# Patient Record
Sex: FEMALE | URBAN - METROPOLITAN AREA
[De-identification: names, ages, dates, MRNs, and addresses within clinical notes are randomized per-mention and may not be internally consistent; named-entity substitution may affect disease eponyms.]

---

## 2022-04-14 ENCOUNTER — APPOINTMENT (OUTPATIENT)
Dept: URBAN - METROPOLITAN AREA CLINIC 259 | Age: 67
Setting detail: DERMATOLOGY
End: 2022-04-18

## 2022-04-14 DIAGNOSIS — L30.8 OTHER SPECIFIED DERMATITIS: ICD-10-CM

## 2022-04-14 DIAGNOSIS — L21.8 OTHER SEBORRHEIC DERMATITIS: ICD-10-CM

## 2022-04-14 PROCEDURE — OTHER PRESCRIPTION: OTHER

## 2022-04-14 PROCEDURE — 99203 OFFICE O/P NEW LOW 30 MIN: CPT

## 2022-04-14 PROCEDURE — OTHER COUNSELING: OTHER

## 2022-04-14 PROCEDURE — OTHER PRESCRIPTION MEDICATION MANAGEMENT: OTHER

## 2022-04-14 PROCEDURE — OTHER MIPS QUALITY: OTHER

## 2022-04-14 RX ORDER — KETOCONAZOLE 20 MG/ML
SHAMPOO, SUSPENSION TOPICAL
Qty: 1 | Refills: 3 | Status: ERX | COMMUNITY
Start: 2022-04-14

## 2022-04-14 RX ORDER — CLOBETASOL PROPIONATE 0.5 MG/G
OINTMENT TOPICAL
Qty: 1 | Refills: 3 | Status: ERX | COMMUNITY
Start: 2022-04-14

## 2022-04-14 ASSESSMENT — LOCATION SIMPLE DESCRIPTION DERM
LOCATION SIMPLE: RIGHT HAND
LOCATION SIMPLE: SCALP
LOCATION SIMPLE: LEFT HAND

## 2022-04-14 ASSESSMENT — LOCATION DETAILED DESCRIPTION DERM
LOCATION DETAILED: LEFT ULNAR DORSAL HAND
LOCATION DETAILED: RIGHT RADIAL DORSAL HAND
LOCATION DETAILED: RIGHT SUPERIOR PARIETAL SCALP

## 2022-04-14 ASSESSMENT — LOCATION ZONE DERM
LOCATION ZONE: HAND
LOCATION ZONE: SCALP

## 2022-04-14 NOTE — PROCEDURE: PRESCRIPTION MEDICATION MANAGEMENT
Initiate Treatment: Clobetasol 0.05% topical cream BID PRN
Render In Strict Bullet Format?: No
Detail Level: Zone
Continue Regimen: Ketoconazole 2% shampoo
Continue Regimen: Desoximetasone topical cream 2-3 times a week

## 2022-04-14 NOTE — HPI: SECONDARY COMPLAINT
How Severe Is This Condition?: mild
Additional History: Patient is here following up on her seborrheic dermatitis on her scalp. She has been using Ketoconazole shampoo, which has helped. She is here to determine if she should continue with the medication.

## 2022-04-14 NOTE — HPI: RASH
What Type Of Note Output Would You Prefer (Optional)?: Standard Output
How Severe Is Your Rash?: moderate
Is This A New Presentation, Or A Follow-Up?: Rash
Additional History: Patient has dry, cracked, sore hands/fingertips. She has been dealing with this for years, and still hasn’t resolved. She is here seeking treatment options.

## 2022-04-14 NOTE — PROCEDURE: COUNSELING
Detail Level: Zone
Patient Specific Counseling (Will Not Stick From Patient To Patient): Recommended warm water soaks, then apply medications and lotions after under occlusion.

## 2022-04-14 NOTE — PROCEDURE: MIPS QUALITY
Quality 431: Preventive Care And Screening: Unhealthy Alcohol Use - Screening: Patient screened for unhealthy alcohol use using a single question and scores less than 2 times per year
Quality 111:Pneumonia Vaccination Status For Older Adults: Pneumococcal Vaccination not Administered or Previously Received, Reason not Otherwise Specified
Quality 110: Preventive Care And Screening: Influenza Immunization: Influenza Immunization Ordered or Recommended, but not Administered due to system reason
Detail Level: Detailed
Quality 130: Documentation Of Current Medications In The Medical Record: Current Medications Documented
Quality 226: Preventive Care And Screening: Tobacco Use: Screening And Cessation Intervention: Patient screened for tobacco use and is an ex/non-smoker

## 2022-12-12 ENCOUNTER — TRANSFERRED RECORDS (OUTPATIENT)
Dept: HEALTH INFORMATION MANAGEMENT | Facility: CLINIC | Age: 67
End: 2022-12-12

## 2023-01-17 ENCOUNTER — TRANSFERRED RECORDS (OUTPATIENT)
Dept: HEALTH INFORMATION MANAGEMENT | Facility: CLINIC | Age: 68
End: 2023-01-17

## 2023-01-31 ENCOUNTER — TRANSFERRED RECORDS (OUTPATIENT)
Dept: HEALTH INFORMATION MANAGEMENT | Facility: CLINIC | Age: 68
End: 2023-01-31
Payer: COMMERCIAL

## 2023-02-02 ENCOUNTER — PATIENT OUTREACH (OUTPATIENT)
Dept: ONCOLOGY | Facility: CLINIC | Age: 68
End: 2023-02-02
Payer: COMMERCIAL

## 2023-02-02 ENCOUNTER — TRANSCRIBE ORDERS (OUTPATIENT)
Dept: OTHER | Age: 68
End: 2023-02-02

## 2023-02-02 DIAGNOSIS — C50.211 MALIGNANT NEOPLASM OF UPPER-INNER QUADRANT OF RIGHT FEMALE BREAST (H): Primary | ICD-10-CM

## 2023-02-02 NOTE — PROGRESS NOTES
New Patient Oncology Nurse Navigator Note     Referring provider: Ivania Nascimento     Referring Clinic/Organization: [facility]     Referred to (specialty:) Medical Oncology and Radiation Oncology      Date Referral Received: February 2, 2023     Evaluation for:  Breast cancer     Clinical History (per Nurse review of records provided):      Ginger bilateral screening mammogram on 11/18/22 and aspiculated mass has developed at the far posterior, medial aspect of the right breast at about 3 o'clock. Posterior portions of the mass are not included on either view; as included here, the mass measures at least 2.4 cm in greatest dimension. No evidence elsewhere for mass or architectural distortion. No calcifications of concern. Right breast ultrasound followed on 12/2/22 and a solid 2.3 cm hypoechoic irregularly marginated mass at the 3:00 position of the right breast, 9 cm from the nipple, corresponding to the mammographic findings. The findings are highly suspicious for malignancy. A few upper limits of normal-sized right axillary lymph nodes without definitive normal enlargement or abnormal cortical thickening.    12/12/22 - [facility] US-guided biopsy.  [facility] biopsy ([id number])  IDC, Grade 3, ER/SC+, HER2 negative by FISH    1/9/23 - Genetic counseling with Marcelina Persaud of [facility]. It appeared from that note patient elected to pursue genetic testing for the custom cancer gene panel evaluating the breast, gyn and common cancer genes HOWEVER, patient did not move forward with that testing according to our conversation on 2/6/23. She did not mesh with provider and does not want to meet with her for follow up. Instead patient is interested in testing through [employer] and asks if our providers would consider those results appropriate for decision making. She has not started testing with any product yet.    1/17/23 -   A.  Breast, right,lumpectomy: Invasive grade  3 ductal carcinoma  B.  Lymph node, sentinel, biopsy: 1 lymph node negative for malignancy.  C.  Breast, right, superior margin re-excision: Benign breast parenchyma, negative for malignancy.  D.  Breast, right, medial margin re-excision: Benign breast parenchyma, negative for malignancy.    Records Location: Care Everywhere, Media and See Bookmarked material     Records Needed:   Breast imaging for past 5 years (No imaging as of 2/2/23)  Genetic testing results (saw counseling on 1/9/23)  Pathology report from 12/12/22 right breast core biopsy  [facility] consult and progress notes    Patient resides in [place], [place] and referral specifically states [place].

## 2023-02-03 NOTE — PROGRESS NOTES
Telephoned and left voice mail for patient requesting call back to assist in scheduling medical oncology consult.

## 2023-02-06 NOTE — PROGRESS NOTES
Ginger returned my call and call transferred to New Patient Scheduling to schedule with medical oncology and radiation oncology at .

## 2023-02-06 NOTE — TELEPHONE ENCOUNTER
RECORDS STATUS - BREAST    Action    Action Taken 23  Spoke w/ [facility] - they will fax [facility] Bx report ([id number] - [id number]) shortly.     Report received, sent to HIM for STAT upload, emailed to Jammie MOROCHO   11:33 AM    Imaging from [facility], [facility] pending resolution/email to  PACS to resolve.  3:42 PM       RECORDS REQUESTED FROM: [facility]   DATE REQUESTED:    NOTES DETAILS STATUS   OFFICE NOTE from referring provider CE - [facility]    OFFICE NOTE from medical oncologist     OFFICE NOTE from surgeon     OFFICE NOTE from radiation oncologist     DISCHARGE SUMMARY from hospital     DISCHARGE REPORT from the ER     OPERATIVE REPORT CE - [facility] 23: Lumpectomy   MEDICATION LIST     LABS     PATHOLOGY REPORTS  (Tissue diagnosis, Stage, ER/IA percentage positive and intensity of staining, HER2 IHC, FISH, and all biopsies from breast and any distant metastasis)                 [facility], ([id number] is [facility], report requested )  [employer] 23: [id number]  22: [id number]   GENONOMIC TESTING     TYPE:   (Next Generation Sequencing, including Foundation One testing, and Oncotype score)     IMAGING (NEED IMAGES & REPORT)     MAMMO Requested  [facility]  23    [facility]  22    [facility]  18    [facility]  16   ULTRASOUND Requested  [facility]  23    [facility]  22, 22   BONE SCAN Requested /6 [facility]  22, 13   BRAIN MRI Requested  [facility]  3/4/15

## 2023-02-07 ENCOUNTER — PRE VISIT (OUTPATIENT)
Dept: ONCOLOGY | Facility: CLINIC | Age: 68
End: 2023-02-07

## 2023-02-07 ENCOUNTER — ONCOLOGY VISIT (OUTPATIENT)
Dept: ONCOLOGY | Facility: CLINIC | Age: 68
End: 2023-02-07
Payer: COMMERCIAL

## 2023-02-07 VITALS
WEIGHT: 217 LBS | SYSTOLIC BLOOD PRESSURE: 180 MMHG | OXYGEN SATURATION: 99 % | HEART RATE: 91 BPM | DIASTOLIC BLOOD PRESSURE: 86 MMHG

## 2023-02-07 DIAGNOSIS — C50.011 MALIGNANT NEOPLASM OF AREOLA OF RIGHT BREAST IN FEMALE, ESTROGEN RECEPTOR POSITIVE (H): Primary | ICD-10-CM

## 2023-02-07 DIAGNOSIS — Z17.0 MALIGNANT NEOPLASM OF AREOLA OF RIGHT BREAST IN FEMALE, ESTROGEN RECEPTOR POSITIVE (H): Primary | ICD-10-CM

## 2023-02-07 PROBLEM — S32.009K LUMBAR PSEUDOARTHROSIS: Status: ACTIVE | Noted: 2023-02-07

## 2023-02-07 PROBLEM — H18.609 KERATOCONUS: Status: ACTIVE | Noted: 2023-02-07

## 2023-02-07 PROBLEM — M54.41 LUMBAGO WITH SCIATICA, RIGHT SIDE: Status: ACTIVE | Noted: 2017-09-20

## 2023-02-07 PROBLEM — B00.1 HERPES LABIALIS: Status: ACTIVE | Noted: 2023-02-07

## 2023-02-07 PROBLEM — H35.30 MACULAR DEGENERATION (SENILE) OF RETINA: Status: ACTIVE | Noted: 2018-04-11

## 2023-02-07 PROBLEM — Z98.1 S/P LUMBAR FUSION: Status: ACTIVE | Noted: 2018-02-01

## 2023-02-07 PROBLEM — G62.9 NEUROPATHY: Status: ACTIVE | Noted: 2023-02-07

## 2023-02-07 PROBLEM — F32.A DEPRESSION: Status: ACTIVE | Noted: 2023-02-07

## 2023-02-07 PROBLEM — L71.9 ACNE ROSACEA: Status: ACTIVE | Noted: 2023-02-07

## 2023-02-07 PROBLEM — C50.219 MALIGNANT NEOPLASM OF UPPER-INNER QUADRANT OF FEMALE BREAST (H): Status: ACTIVE | Noted: 2023-02-07

## 2023-02-07 PROBLEM — H25.10 NUCLEAR SCLEROTIC CATARACT: Status: ACTIVE | Noted: 2023-02-07

## 2023-02-07 PROBLEM — G25.81 RLS (RESTLESS LEGS SYNDROME): Status: ACTIVE | Noted: 2023-02-07

## 2023-02-07 PROBLEM — D49.9 ESTROGEN RECEPTOR POSITIVE NEOPLASM: Status: ACTIVE | Noted: 2023-02-07

## 2023-02-07 PROCEDURE — 99204 OFFICE O/P NEW MOD 45 MIN: CPT | Performed by: INTERNAL MEDICINE

## 2023-02-07 RX ORDER — DESOXIMETASONE 2.5 MG/G
CREAM TOPICAL
COMMUNITY
Start: 2021-09-27

## 2023-02-07 RX ORDER — LISINOPRIL/HYDROCHLOROTHIAZIDE 10-12.5 MG
1 TABLET ORAL
COMMUNITY
Start: 2022-10-13

## 2023-02-07 RX ORDER — ELETRIPTAN HYDROBROMIDE 40 MG/1
40 TABLET, FILM COATED ORAL
COMMUNITY
End: 2023-07-18

## 2023-02-07 RX ORDER — CYCLOSPORINE 0.5 MG/ML
1 EMULSION OPHTHALMIC 2 TIMES DAILY
COMMUNITY
Start: 2021-07-15

## 2023-02-07 RX ORDER — IBUPROFEN 200 MG
TABLET ORAL
COMMUNITY

## 2023-02-07 RX ORDER — FEXOFENADINE HCL 180 MG/1
180 TABLET ORAL
COMMUNITY

## 2023-02-07 RX ORDER — CELECOXIB 200 MG/1
1 CAPSULE ORAL 2 TIMES DAILY
COMMUNITY
Start: 2021-09-08 | End: 2023-07-18

## 2023-02-07 RX ORDER — FLUTICASONE PROPIONATE 50 MCG
2 SPRAY, SUSPENSION (ML) NASAL
COMMUNITY

## 2023-02-07 RX ORDER — KETOCONAZOLE 20 MG/ML
SHAMPOO TOPICAL
COMMUNITY
Start: 2022-04-14

## 2023-02-07 ASSESSMENT — PAIN SCALES - GENERAL: PAINLEVEL: MILD PAIN (3)

## 2023-02-07 NOTE — LETTER
2/7/2023         RE: Ginger Hernandez  [address] [facility] [address]        Dear Colleague,    Thank you for referring your patient, Ginger Hernandez, to the [facility]. Please see a copy of my visit note below.    [facility]  MEDICAL ONCOLOGY    NEW PATIENT VISIT NOTE    Reason for consultation: Breast cancer    Referring Provider: Ivania Martins MD    Oncology Treatment Summary  1. 11/18/22 DEXA normal  2. 11/18/22 screening mammogram with Right sided mass 2.4 cm  3. 12/2/22 US with 2.3 cm mass and upper limit of normal LN  4. 12/12/22 bx with IDC, ER+DC+ Her2 2+ FISH negative  5. 1/17/23 RIGHT lumpectomy G3 IDC, 3.3 cm 0/1 SLN, ER90%+ DC 20%+ Spi9vyg negative via FISH       HISTORY OF PRESENTING ILLNESS  Pleasant 67 year old seen for a new dx of breast cancer. Her hx is as above. She is doing well post operatively and has no systemic complaints.     PAST MEDICAL HISTORY  Hepatitis C, migraines, HTN, GERD      CURRENT OUTPATIENT MEDICATIONS  No current outpatient medications on file.     No current facility-administered medications for this visit.        ALLERGIES   Not on File     SOCIAL HISTORY  Lives in [place], single, retired, no tobacco, rare etoh, no children.      FAMILY HISTORY  Mother with breast cancer in late 60s, MGM breast cancer in 60s, she was told by a 2nd cousin that they have a brca mutation and was given a piece of paper about it some years ago but has not followed up on it.     REVIEW OF SYSTEMS  Review Of Systems  Skin: negative  Eyes: negative  Ears/Nose/Throat: negative  Respiratory: No shortness of breath, dyspnea on exertion, cough, or hemoptysis  Cardiovascular: negative  Gastrointestinal: negative  Genitourinary: negative  Musculoskeletal: negative  Neurologic: negative  Psychiatric: negative  Hematologic/Lymphatic/Immunologic: negative  Endocrine: negative      PHYSICAL EXAM  B/P: Data Unavailable, T: Data  Unavailable, P: Data Unavailable, R: Data Unavailable  Wt Readings from Last 3 Encounters:   No data found for Wt       ECOG PPS0    Physical Exam  Vitals reviewed.   Constitutional:       Appearance: Normal appearance.   HENT:      Head: Normocephalic and atraumatic.   Eyes:      Extraocular Movements: Extraocular movements intact.      Conjunctiva/sclera: Conjunctivae normal.      Pupils: Pupils are equal, round, and reactive to light.   Cardiovascular:      Rate and Rhythm: Normal rate and regular rhythm.   Pulmonary:      Effort: Pulmonary effort is normal.      Breath sounds: Normal breath sounds.   Abdominal:      General: Abdomen is flat.      Palpations: Abdomen is soft.   Skin:     General: Skin is warm.   Neurological:      General: No focal deficit present.      Mental Status: She is alert and oriented to person, place, and time. Mental status is at baseline.   Psychiatric:         Behavior: Behavior normal.              LABORATORY AND IMAGING STUDIES  Final Diagnosis     A.  Breast, right,lumpectomy: Invasive grade 3 ductal carcinoma  B.  Lymph node, sentinel, biopsy: 1 lymph node negative for malignancy.  C.  Breast, right, superior margin re-excision: Benign breast parenchyma, negative for malignancy.  D.  Breast, right, medial margin re-excision: Benign breast parenchyma, negative for malignancy.     CAP Synoptic Report  Procedure:  excision (less than total mastectomy)  Specimen Laterality:  right  Histologic Type:  Invasive ductal carcinoma (invasive breast carcinoma of no special type)   Histologic Grade ([place] Histologic Score)         Glandular (acinar)/tubular differentiation:  score 3          Nuclear pleomorphism:  score 3          Mitotic rate:  score 3          Overall thgthrthathdtheth:th th4th Tumor Size:   (greatest dimension of largest invasive focus >0.1 cm):   3.3 cm  Tumor Focality:  single focus of invasive carcinoma  Ductal Carcinoma in Situ (DCIS):  present          Architectural pattern(s):   solid         Nuclear grade:  III (high)         Necrosis:  Present, focal (small foci or single cell)         Extensive intraductal component (EIC): negative  Tumor Extent:           Skin:  not present             Nipple:  nipple not present           Skeletal muscle:  not present  Lymphatic and/or Vascular Invasion:  present  Dermal Lymphovascular Invasion:  cannot be determined  Treatment Effect :  No known presurgical therapy  Margins         Invasive carcinoma:  all margins negative for invasive carcinoma               Distance to closest margin(s), specify: 0.1 cm to lateral and anterior margins         DCIS:  all margins negative for DCIS                Distance from DCIS to closest margin (specify): 0.1 cm to lateral margin.               Closest margin(s) to DCIS (required if less than 0.2 cm):  lateral  Regional Lymph Node Status:  all regional lymph nodes negative for tumor 0/1          Total number of lymph nodes examined:  1         Number of sentinel lymph nodes examined:  1  Distant Metastasis:  not applicable  Biomarkers:  performed on previous [facility] biopsy ([id number]); not repeated on current specimen         Estrogen Receptor: Positive (%, strong)         Progesterone Receptor: Positive (20-30%, strong)         HER2: Negative by FISH         Cold Ischemia and Fixation Times:              Meet requirements specified in latest version of the ASCO/CAP guidelines  Tumor block(s) for molecular/send out tests:  A2           ASSESSMENT  AND RECOMMENDATIONS:    1. T2N0M0 ER+IA+Her2 negative IDC s/p RIGHT lumpectomy.   2. Family hx of breast cancer with vague question of a brca mutation in family.      Plan    We discussed her newly diagnosed breast cancer and reviewed the diagnosis, prognosis, stage and options for therapy.      We reviewed both local and systemic therapy for breast cancer. Local therapy is a mastectomy or lumpectomy followed by radiation. These are equivalent in overall survival  however post lumpectomy and radiation there is a small risk of recurrence in the ipsilateral breast cancer if this were to occur one would then have to have a mastectomy.  A sentinel lymph node bx is done with both procedures. She has caro a lumpectomy and is pleased with the result. She is aware she will be needing eventual radiation.    We then discussed risk of systemic recurrence and evaluating this in context of stage, ER/NH and Her2 status. Given that she is ER+NH+ and Her2 negative she would likely be a candidate for oncotype testing. This is predictive for benefit to adjuvant chemotherapy and prognostic to risk of recurrence. We will await her surgery and final pathology and if she meets criteria (< 3 lymph nodes involved) we will plan to send it off. She will then see me when the results are available to discuss next steps.    We also reviewed endocrine therapy and the options. Given her normal bone density we will likely plan for arimidex. She will see me when the oncotype is complete.    Fartun Jackson MD on 2/8/2023 at 8:48 AM             Again, thank you for allowing me to participate in the care of your patient.        Sincerely,        Fartun Jackson MD

## 2023-02-07 NOTE — NURSING NOTE
Oncology Rooming Note    February 7, 2023 1:58 PM   Ginger Hernandez is a 67 year old female who presents for:    Chief Complaint   Patient presents with     Oncology Clinic Visit     New Patient     Initial Vitals: BP (!) 180/86 (BP Location: Left arm, Patient Position: Chair, Cuff Size: Adult Large)   Pulse 91   Wt 98.4 kg (217 lb)   SpO2 99%  There is no height or weight on file to calculate BMI. There is no height or weight on file to calculate BSA.  Mild Pain (3) Comment: Data Unavailable   No LMP recorded.  Allergies reviewed: Yes  Medications reviewed: Yes    Medications: Medication refills not needed today.  Pharmacy name entered into EPIC: Data Unavailable    Clinical concerns: New Patient     Dr. Jackson was notified.      Krysta Estes CMA

## 2023-02-07 NOTE — PROGRESS NOTES
[facility]  MEDICAL ONCOLOGY    NEW PATIENT VISIT NOTE    Reason for consultation: Breast cancer    Referring Provider: Ivania Martins MD    Oncology Treatment Summary  1. 11/18/22 DEXA normal  2. 11/18/22 screening mammogram with Right sided mass 2.4 cm  3. 12/2/22 US with 2.3 cm mass and upper limit of normal LN  4. 12/12/22 bx with IDC, ER+VT+ Her2 2+ FISH negative  5. 1/17/23 RIGHT lumpectomy G3 IDC, 3.3 cm 0/1 SLN, ER90%+ VT 20%+ Gkz8vup negative via FISH       HISTORY OF PRESENTING ILLNESS  Pleasant 67 year old seen for a new dx of breast cancer. Her hx is as above. She is doing well post operatively and has no systemic complaints.     PAST MEDICAL HISTORY  Hepatitis C, migraines, HTN, GERD      CURRENT OUTPATIENT MEDICATIONS  No current outpatient medications on file.     No current facility-administered medications for this visit.        ALLERGIES   Not on File     SOCIAL HISTORY  Lives in [place], single, retired, no tobacco, rare etoh, no children.      FAMILY HISTORY  Mother with breast cancer in late 60s, MGM breast cancer in 60s, she was told by a 2nd cousin that they have a brca mutation and was given a piece of paper about it some years ago but has not followed up on it.     REVIEW OF SYSTEMS  Review Of Systems  Skin: negative  Eyes: negative  Ears/Nose/Throat: negative  Respiratory: No shortness of breath, dyspnea on exertion, cough, or hemoptysis  Cardiovascular: negative  Gastrointestinal: negative  Genitourinary: negative  Musculoskeletal: negative  Neurologic: negative  Psychiatric: negative  Hematologic/Lymphatic/Immunologic: negative  Endocrine: negative      PHYSICAL EXAM  B/P: Data Unavailable, T: Data Unavailable, P: Data Unavailable, R: Data Unavailable  Wt Readings from Last 3 Encounters:   No data found for Wt       ECOG PPS0    Physical Exam  Vitals reviewed.   Constitutional:       Appearance: Normal appearance.   HENT:      Head: Normocephalic and atraumatic.    Eyes:      Extraocular Movements: Extraocular movements intact.      Conjunctiva/sclera: Conjunctivae normal.      Pupils: Pupils are equal, round, and reactive to light.   Cardiovascular:      Rate and Rhythm: Normal rate and regular rhythm.   Pulmonary:      Effort: Pulmonary effort is normal.      Breath sounds: Normal breath sounds.   Abdominal:      General: Abdomen is flat.      Palpations: Abdomen is soft.   Skin:     General: Skin is warm.   Neurological:      General: No focal deficit present.      Mental Status: She is alert and oriented to person, place, and time. Mental status is at baseline.   Psychiatric:         Behavior: Behavior normal.              LABORATORY AND IMAGING STUDIES  Final Diagnosis     A.  Breast, right,lumpectomy: Invasive grade 3 ductal carcinoma  B.  Lymph node, sentinel, biopsy: 1 lymph node negative for malignancy.  C.  Breast, right, superior margin re-excision: Benign breast parenchyma, negative for malignancy.  D.  Breast, right, medial margin re-excision: Benign breast parenchyma, negative for malignancy.     CAP Synoptic Report  Procedure:  excision (less than total mastectomy)  Specimen Laterality:  right  Histologic Type:  Invasive ductal carcinoma (invasive breast carcinoma of no special type)   Histologic Grade ([place] Histologic Score)         Glandular (acinar)/tubular differentiation:  score 3          Nuclear pleomorphism:  score 3          Mitotic rate:  score 3          Overall thgthrthathdtheth:th th4th Tumor Size:   (greatest dimension of largest invasive focus >0.1 cm):   3.3 cm  Tumor Focality:  single focus of invasive carcinoma  Ductal Carcinoma in Situ (DCIS):  present          Architectural pattern(s):  solid         Nuclear grade:  III (high)         Necrosis:  Present, focal (small foci or single cell)         Extensive intraductal component (EIC): negative  Tumor Extent:           Skin:  not present             Nipple:  nipple not present           Skeletal  muscle:  not present  Lymphatic and/or Vascular Invasion:  present  Dermal Lymphovascular Invasion:  cannot be determined  Treatment Effect :  No known presurgical therapy  Margins         Invasive carcinoma:  all margins negative for invasive carcinoma               Distance to closest margin(s), specify: 0.1 cm to lateral and anterior margins         DCIS:  all margins negative for DCIS                Distance from DCIS to closest margin (specify): 0.1 cm to lateral margin.               Closest margin(s) to DCIS (required if less than 0.2 cm):  lateral  Regional Lymph Node Status:  all regional lymph nodes negative for tumor 0/1          Total number of lymph nodes examined:  1         Number of sentinel lymph nodes examined:  1  Distant Metastasis:  not applicable  Biomarkers:  performed on previous [facility] biopsy ([id number]); not repeated on current specimen         Estrogen Receptor: Positive (%, strong)         Progesterone Receptor: Positive (20-30%, strong)         HER2: Negative by FISH         Cold Ischemia and Fixation Times:              Meet requirements specified in latest version of the ASCO/CAP guidelines  Tumor block(s) for molecular/send out tests:  A2           ASSESSMENT  AND RECOMMENDATIONS:    1. T2N0M0 ER+DC+Her2 negative IDC s/p RIGHT lumpectomy.   2. Family hx of breast cancer with vague question of a brca mutation in family.      Plan    We discussed her newly diagnosed breast cancer and reviewed the diagnosis, prognosis, stage and options for therapy.      We reviewed both local and systemic therapy for breast cancer. Local therapy is a mastectomy or lumpectomy followed by radiation. These are equivalent in overall survival however post lumpectomy and radiation there is a small risk of recurrence in the ipsilateral breast cancer if this were to occur one would then have to have a mastectomy.  A sentinel lymph node bx is done with both procedures. She has caro a lumpectomy and is  pleased with the result. She is aware she will be needing eventual radiation.    We then discussed risk of systemic recurrence and evaluating this in context of stage, ER/DC and Her2 status. Given that she is ER+DC+ and Her2 negative she would likely be a candidate for oncotype testing. This is predictive for benefit to adjuvant chemotherapy and prognostic to risk of recurrence. We will await her surgery and final pathology and if she meets criteria (< 3 lymph nodes involved) we will plan to send it off. She will then see me when the results are available to discuss next steps.    We also reviewed endocrine therapy and the options. Given her normal bone density we will likely plan for arimidex. She will see me when the oncotype is complete.    Fartun Jackson MD on 2/8/2023 at 8:48 AM

## 2023-02-08 ENCOUNTER — MEDICAL CORRESPONDENCE (OUTPATIENT)
Dept: HEALTH INFORMATION MANAGEMENT | Facility: CLINIC | Age: 68
End: 2023-02-08
Payer: COMMERCIAL

## 2023-02-09 ENCOUNTER — PATIENT OUTREACH (OUTPATIENT)
Dept: ONCOLOGY | Facility: CLINIC | Age: 68
End: 2023-02-09

## 2023-02-09 NOTE — PROGRESS NOTES
Oncotype testing requested via [employer] online portal on specimen # [id number] collected on 1/17/23 at [facility].   Pathology report, face sheet, copy of insurance card, and oncology visit note faxed to [phone].

## 2023-02-23 ENCOUNTER — PATIENT OUTREACH (OUTPATIENT)
Dept: ONCOLOGY | Facility: CLINIC | Age: 68
End: 2023-02-23
Payer: COMMERCIAL

## 2023-02-23 NOTE — PROGRESS NOTES
[facility]: Cancer Care Note    Received final Oncotype Dx results report via fax from [employer].  Per results report, patient's Recurrence Score (RS) Result is 36.  Report sent to [facility] for scanning, and a copy was saved for Dr. Jackson to review.      Patient has an appointment scheduled with Dr. Jackson tomorrow to review results.                                   Pedrito Kerns, RN, BSN, OCN  RN Care Coordinator - Oncology  [facility]

## 2023-02-24 ENCOUNTER — PATIENT OUTREACH (OUTPATIENT)
Dept: ONCOLOGY | Facility: CLINIC | Age: 68
End: 2023-02-24

## 2023-02-24 ENCOUNTER — ONCOLOGY VISIT (OUTPATIENT)
Dept: ONCOLOGY | Facility: CLINIC | Age: 68
End: 2023-02-24
Payer: COMMERCIAL

## 2023-02-24 VITALS — SYSTOLIC BLOOD PRESSURE: 179 MMHG | WEIGHT: 213 LBS | HEART RATE: 89 BPM | DIASTOLIC BLOOD PRESSURE: 101 MMHG

## 2023-02-24 DIAGNOSIS — C50.011 MALIGNANT NEOPLASM OF AREOLA OF RIGHT BREAST IN FEMALE, ESTROGEN RECEPTOR POSITIVE (H): Primary | ICD-10-CM

## 2023-02-24 DIAGNOSIS — Z17.0 MALIGNANT NEOPLASM OF AREOLA OF RIGHT BREAST IN FEMALE, ESTROGEN RECEPTOR POSITIVE (H): Primary | ICD-10-CM

## 2023-02-24 LAB
ALBUMIN SERPL-MCNC: 4 G/DL (ref 3.4–5)
ALP SERPL-CCNC: 54 U/L (ref 40–150)
ALT SERPL W P-5'-P-CCNC: 54 U/L (ref 0–50)
ANION GAP SERPL CALCULATED.3IONS-SCNC: 3 MMOL/L (ref 3–14)
AST SERPL W P-5'-P-CCNC: 21 U/L (ref 0–45)
BASOPHILS # BLD AUTO: 0.1 10E3/UL (ref 0–0.2)
BASOPHILS NFR BLD AUTO: 1 %
BILIRUB SERPL-MCNC: 0.3 MG/DL (ref 0.2–1.3)
BUN SERPL-MCNC: 15 MG/DL (ref 7–30)
CALCIUM SERPL-MCNC: 9.3 MG/DL (ref 8.5–10.1)
CHLORIDE BLD-SCNC: 110 MMOL/L (ref 94–109)
CO2 SERPL-SCNC: 26 MMOL/L (ref 20–32)
CREAT SERPL-MCNC: 0.76 MG/DL (ref 0.52–1.04)
EOSINOPHIL # BLD AUTO: 0.2 10E3/UL (ref 0–0.7)
EOSINOPHIL NFR BLD AUTO: 2 %
ERYTHROCYTE [DISTWIDTH] IN BLOOD BY AUTOMATED COUNT: 12.8 % (ref 10–15)
GFR SERPL CREATININE-BSD FRML MDRD: 85 ML/MIN/1.73M2
GLUCOSE BLD-MCNC: 148 MG/DL (ref 70–99)
HCT VFR BLD AUTO: 47.7 % (ref 35–47)
HGB BLD-MCNC: 15.7 G/DL (ref 11.7–15.7)
IMM GRANULOCYTES # BLD: 0 10E3/UL
IMM GRANULOCYTES NFR BLD: 0 %
LYMPHOCYTES # BLD AUTO: 3.4 10E3/UL (ref 0.8–5.3)
LYMPHOCYTES NFR BLD AUTO: 36 %
MCH RBC QN AUTO: 28.7 PG (ref 26.5–33)
MCHC RBC AUTO-ENTMCNC: 32.9 G/DL (ref 31.5–36.5)
MCV RBC AUTO: 87 FL (ref 78–100)
MONOCYTES # BLD AUTO: 0.8 10E3/UL (ref 0–1.3)
MONOCYTES NFR BLD AUTO: 9 %
NEUTROPHILS # BLD AUTO: 5 10E3/UL (ref 1.6–8.3)
NEUTROPHILS NFR BLD AUTO: 52 %
NRBC # BLD AUTO: 0 10E3/UL
NRBC BLD AUTO-RTO: 0 /100
PLATELET # BLD AUTO: 254 10E3/UL (ref 150–450)
POTASSIUM BLD-SCNC: 4.1 MMOL/L (ref 3.4–5.3)
PROT SERPL-MCNC: 7.3 G/DL (ref 6.8–8.8)
RBC # BLD AUTO: 5.47 10E6/UL (ref 3.8–5.2)
SODIUM SERPL-SCNC: 139 MMOL/L (ref 133–144)
WBC # BLD AUTO: 9.4 10E3/UL (ref 4–11)

## 2023-02-24 PROCEDURE — 99214 OFFICE O/P EST MOD 30 MIN: CPT | Performed by: INTERNAL MEDICINE

## 2023-02-24 PROCEDURE — 85025 COMPLETE CBC W/AUTO DIFF WBC: CPT | Performed by: INTERNAL MEDICINE

## 2023-02-24 PROCEDURE — 80053 COMPREHEN METABOLIC PANEL: CPT | Performed by: INTERNAL MEDICINE

## 2023-02-24 PROCEDURE — 36415 COLL VENOUS BLD VENIPUNCTURE: CPT | Performed by: INTERNAL MEDICINE

## 2023-02-24 RX ORDER — HEPARIN SODIUM (PORCINE) LOCK FLUSH IV SOLN 100 UNIT/ML 100 UNIT/ML
5 SOLUTION INTRAVENOUS
Status: CANCELLED | OUTPATIENT
Start: 2023-03-02

## 2023-02-24 RX ORDER — HEPARIN SODIUM,PORCINE 10 UNIT/ML
5 VIAL (ML) INTRAVENOUS
Status: CANCELLED | OUTPATIENT
Start: 2023-03-02

## 2023-02-24 RX ORDER — ALBUTEROL SULFATE 0.83 MG/ML
2.5 SOLUTION RESPIRATORY (INHALATION)
Status: CANCELLED | OUTPATIENT
Start: 2023-03-02

## 2023-02-24 RX ORDER — DIPHENHYDRAMINE HYDROCHLORIDE 50 MG/ML
50 INJECTION INTRAMUSCULAR; INTRAVENOUS
Status: CANCELLED
Start: 2023-03-02

## 2023-02-24 RX ORDER — ALBUTEROL SULFATE 90 UG/1
1-2 AEROSOL, METERED RESPIRATORY (INHALATION)
Status: CANCELLED
Start: 2023-03-02

## 2023-02-24 RX ORDER — MEPERIDINE HYDROCHLORIDE 25 MG/ML
25 INJECTION INTRAMUSCULAR; INTRAVENOUS; SUBCUTANEOUS EVERY 30 MIN PRN
Status: CANCELLED | OUTPATIENT
Start: 2023-03-02

## 2023-02-24 RX ORDER — ONDANSETRON 2 MG/ML
8 INJECTION INTRAMUSCULAR; INTRAVENOUS ONCE
Status: CANCELLED | OUTPATIENT
Start: 2023-03-02

## 2023-02-24 RX ORDER — METHYLPREDNISOLONE SODIUM SUCCINATE 125 MG/2ML
125 INJECTION, POWDER, LYOPHILIZED, FOR SOLUTION INTRAMUSCULAR; INTRAVENOUS
Status: CANCELLED
Start: 2023-03-02

## 2023-02-24 RX ORDER — EPINEPHRINE 1 MG/ML
0.3 INJECTION, SOLUTION INTRAMUSCULAR; SUBCUTANEOUS EVERY 5 MIN PRN
Status: CANCELLED | OUTPATIENT
Start: 2023-03-02

## 2023-02-24 NOTE — PROGRESS NOTES
[facility]: Cancer Care Initial Note                                    Discussion with Patient:                                                      [facility]: Chemotherapy Education Notes    Chemotherapy education was completed with patient today in person. Handouts from [facility] were discussed and provided to the patient to take home.  Reviewed the following information with the patient:     Chemotherapy Regimen and Schedule: Taxotere/Cytoxan, with infusions every 3 weeks.  4 cycles are planned.    Tests and/or procedures required prior to chemotherapy start:   1. Blood work to be done today    General Chemotherapy Information: Specific medication names and medication delivery methods; possible chemotherapy side effects and management of side effects, including but not limited to: skin changes/nail changes, anemia, neutropenia, thrombocytopenia, diarrhea/constipation, nausea/vomiting, hair loss, memory changes, mouth sores, taste changes, appetite changes, peripheral neuropathy, fatigue, infertility, myelosuppression, increased risk for infection, increased risk for bleeding and/or bruising, possible allergic or hypersensitivity reaction.  Reviewed the importance of infection prevention, and ways to stay healthy during chemotherapy including eating a health, well-balanced diet, adequate protein intake, and drinking plenty of fluids.  Reviewed the practice of closely monitoring lab values throughout chemotherapy treatment, what lab tests will be checked (and what changes of these values meant), along with the possibility of IV hydration or blood product transfusion, or the need to defer or hold treatment.  Also reviewed signs/symptoms that should be reported to care team or on-call provider immediately, including: temperature of 100.4 degrees or higher, shortness of breath, chest pain, unusual bruising, bleeding symptoms, extreme fatigue, >4-6 episodes of diarrhea in 24 hours, uncontrolled  "nausea/vomiting, symptoms of dehydration, or signs of an allergic reaction.      MD has discussed potential gonadotoxicity and detrimental effects on fertility related to chemotherapy treatment.  Patient verbalizes understanding, but declines at this time..     Written Information: Patient was provided with the following handouts from [facility]: \"Getting Ready for Chemotherapy: What to Expect, Before, During, and After your Treatment,\" \"Eating Hints: Before, During, and After Cancer Treatment,\" printouts on possible chemotherapy side effects/ways to cope with side effects, \"When to Call the Doctor,\" and \"Self-Care Tips During Cancer Treatment.\"  Patient was also provided with drug-specific handouts from [facility].  Patient was also provided with information regarding various programs offered at [facility], hair loss resources (if applicable), a list of resources for cancer patients, as well as important contact information for our cancer clinic including scheduling team, nurse triage line, direct phone number for RN Care Coordinator, and the after-hours Nurse Advice Line.    No barriers to learning identified. Patient verbalized understanding of all written and verbal information. All questions answered to patient s satisfaction.  Learning barriers and method preference are documented in the patient education flowsheet. Patient states understanding and is in agreement with this plan.  Patient understands that they will be receiving a phone call from a member of our scheduling team to schedule future appointments.  Patient instructed to call with further questions or concerns.    Assessment:                                                      Initial  Current living arrangement:: I live alone  Informal Support system:: Friends  Bed or wheelchair confined:: No  Mobility Status: Independent w/Device  Transportation means:: Regular car  Medication adherence problem (GOAL):: No  Referrals Placed: " Outpatient Infusion  Advanced Care Plan/Directive Status: Considering Options  Patient Reported Pain?: Yes, is currently well-managed  Pain Score: Mild Pain (3)    Plan of Care Education Review:   Assessment completed with:: Patient    Current living arrangement  I live alone    Plan of Care Education   Yearly learning assessment completed?: Yes (see Education tab)  Diagnosis:: Right breast cancer  Does patient understand diagnosis?: Yes  Tx plan/regimen:: TCx4 cycles  Does patient understand treatment plan/regimen?: Yes  Preparing for treatment:: Reviewed treatment preparation information with patient (vascular access, day of chemo, visitor policy, what to bring, etc.)  Vascular access:: Peripheral IV  Side effect education:: Diarrhea/Constipation;Fatigue;Hair loss;Neuropathy;Nausea/Vomiting;Mylosuppression;Lab value monitoring (anemia, neutropenia, thrombocytopenia)  Transportation means:: Regular car  Safety/self care at home reviewed with patient:: Yes  Informal Support system:: Friends  Coping - concerns/fears reviewed with patient:: Yes  Plan of Care:: Lab appointment;Treatment schedule  When to call provider:: Increased shortness of breath;New/worsening pain;Shaking chills;Temperature >100.4F;Uncontrolled diarrhea/constipation;Uncontrolled nausea/vomiting;Bleeding  Reasons for deferring treatment reviewed with patient:: Yes    Evaluation of Learning  Patient Education Provided: Yes  Readiness:: Acceptance  Method:: Booklet/Handout;Explanation  Response:: Verbalizes understanding           Intervention/Education provided during outreach:                                                       Patient to follow up as scheduled at next appt  Confirmed patient has clinic and triage numbers    Signature:  Charissa Pritchett RN-BSN, PHN, OCN  [facility]

## 2023-02-24 NOTE — PROGRESS NOTES
[facility] PHYSICIANS  MEDICAL ONCOLOGY    RETURN PATIENT VISIT NOTE    Reason for visit: Breast cancer    Oncology Treatment Summary  1. 11/18/22 DEXA normal  2. 11/18/22 screening mammogram with Right sided mass 2.4 cm  3. 12/2/22 US with 2.3 cm mass and upper limit of normal LN  4. 12/12/22 bx with IDC, ER+PA+ Her2 2+ FISH negative  5. 1/17/23 RIGHT lumpectomy G3 IDC, 3.3 cm 0/1 SLN, ER90%+ PA 20%+ Gbg1jsy negative via FISH  6. Oncotype recurrence score 36, high risk     HISTORY OF PRESENTING ILLNESS  Pleasant 67 year old seen today for followup of her oncotype recurrence score. She has a lot of arthritis and walks with a cane. 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     PAST MEDICAL HISTORY  Hepatitis C, migraines, HTN, GERD, lumbar fusion, arthritis.      CURRENT OUTPATIENT MEDICATIONS  Current Outpatient Medications   Medication     celecoxib (CELEBREX) 200 MG capsule     Cholecalciferol 250 MCG (24926 UT) CAPS     cycloSPORINE (RESTASIS) 0.05 % ophthalmic emulsion     desoximetasone (TOPICORT) 0.25 % external cream     eletriptan (RELPAX) 40 MG tablet     esomeprazole (NEXIUM) 20 MG DR capsule     fexofenadine (ALLEGRA) 180 MG tablet     fluticasone (FLONASE) 50 MCG/ACT nasal spray     ibuprofen (ADVIL/MOTRIN) 200 MG tablet     ketoconazole (NIZORAL) 2 % external shampoo     lisinopril-hydrochlorothiazide (ZESTORETIC) 10-12.5 MG tablet     omeprazole (PRILOSEC) 20 MG DR capsule     No current facility-administered medications for this visit.        ALLERGIES     Allergies   Allergen Reactions     Diagnostic X-Ray Materials Anaphylaxis and Hives     Other reaction(s): Unknown  HUT Comment: xray dye; HUT Reaction: Anaphylaxis; HUT Reaction: Hives; HUT Severity: High; HUT Noted: 20110301  xray dye  xray dye       Ketamine Other (See Comments)     Other reaction(s): Emotional Disturbance,  "Unknown  \"complete, emotional reaction\"; HUT Comment: \"complete, emotional reaction\"\"complete, emotional reaction\"; HUT Reaction: Emotional Disturbance; HUT Noted: 20180525  \"complete, emotional reaction\"  \"complete, emotional reaction\"  \"complete, emotional reaction\"  Other reaction(s): Emotional Disturbance  \"complete, emotional reaction\"  \"complete, emotional reaction\"       Nabumetone      Other reaction(s): Gastrointestinal, GI Upset, Unknown  gerd; HUT Comment: gerd; HUT Reaction: Gastrointestinal; HUT Noted: 20130123  gerd  Other reaction(s): Gastrointestinal, GI Upset  gerd  gerd       Pregabalin      Other reaction(s): confusion, Confusion, Confusion, Mental Status Change  HUT Reaction: Confusion; HUT Reaction: Confusion; HUT Noted: 20120315  Other reaction(s): Confusion, Mental Status Change          SOCIAL HISTORY  Lives in [place], single, retired, no tobacco, rare etoh, no children.      FAMILY HISTORY  Mother with breast cancer in late 60s, MGM breast cancer in 60s, she was told by a 2nd cousin that they have a brca mutation and was given a piece of paper about it some years ago but has not followed up on it.     REVIEW OF SYSTEMS  Review Of Systems  Skin: negative  Eyes: negative  Ears/Nose/Throat: negative  Respiratory: No shortness of breath, dyspnea on exertion, cough, or hemoptysis  Cardiovascular: negative  Gastrointestinal: negative  Genitourinary: negative  Musculoskeletal: negative  Neurologic: negative  Psychiatric: negative  Hematologic/Lymphatic/Immunologic: negative  Endocrine: negative      PHYSICAL EXAM  B/P: Data Unavailable, T: Data Unavailable, P: Data Unavailable, R: Data Unavailable  Wt Readings from Last 3 Encounters:   02/07/23 98.4 kg (217 lb)       ECOG PPS0    Physical Exam  Vitals reviewed.   Constitutional:       Appearance: Normal appearance.   HENT:      Head: Normocephalic and atraumatic.   Eyes:      Extraocular Movements: Extraocular movements intact.      " Conjunctiva/sclera: Conjunctivae normal.      Pupils: Pupils are equal, round, and reactive to light.   Neurological:      General: No focal deficit present.      Mental Status: She is alert and oriented to person, place, and time. Mental status is at baseline.   Psychiatric:         Behavior: Behavior normal.              LABORATORY AND IMAGING STUDIES  Final Diagnosis     A.  Breast, right,lumpectomy: Invasive grade 3 ductal carcinoma  B.  Lymph node, sentinel, biopsy: 1 lymph node negative for malignancy.  C.  Breast, right, superior margin re-excision: Benign breast parenchyma, negative for malignancy.  D.  Breast, right, medial margin re-excision: Benign breast parenchyma, negative for malignancy.     CAP Synoptic Report  Procedure:  excision (less than total mastectomy)  Specimen Laterality:  right  Histologic Type:  Invasive ductal carcinoma (invasive breast carcinoma of no special type)   Histologic Grade (Gilbert Histologic Score)         Glandular (acinar)/tubular differentiation:  score 3          Nuclear pleomorphism:  score 3          Mitotic rate:  score 3          Overall ndgndrndanddndend:nd nd2nd Tumor Size:   (greatest dimension of largest invasive focus >0.1 cm):   3.3 cm  Tumor Focality:  single focus of invasive carcinoma  Ductal Carcinoma in Situ (DCIS):  present          Architectural pattern(s):  solid         Nuclear grade:  III (high)         Necrosis:  Present, focal (small foci or single cell)         Extensive intraductal component (EIC): negative  Tumor Extent:           Skin:  not present             Nipple:  nipple not present           Skeletal muscle:  not present  Lymphatic and/or Vascular Invasion:  present  Dermal Lymphovascular Invasion:  cannot be determined  Treatment Effect :  No known presurgical therapy  Margins         Invasive carcinoma:  all margins negative for invasive carcinoma               Distance to closest margin(s), specify: 0.1 cm to lateral and anterior margins          DCIS:  all margins negative for DCIS                Distance from DCIS to closest margin (specify): 0.1 cm to lateral margin.               Closest margin(s) to DCIS (required if less than 0.2 cm):  lateral  Regional Lymph Node Status:  all regional lymph nodes negative for tumor 0/1          Total number of lymph nodes examined:  1         Number of sentinel lymph nodes examined:  1  Distant Metastasis:  not applicable  Biomarkers:  performed on previous [facility] biopsy ([id number]); not repeated on current specimen         Estrogen Receptor: Positive (%, strong)         Progesterone Receptor: Positive (20-30%, strong)         HER2: Negative by FISH         Cold Ischemia and Fixation Times:              Meet requirements specified in latest version of the ASCO/CAP guidelines  Tumor block(s) for molecular/send out tests:  A2           ASSESSMENT  AND RECOMMENDATIONS:    1. T2N0M0 ER+WY+Her2 negative IDC s/p RIGHT lumpectomy, high risk oncotype score 36.   2. Family hx of breast cancer with vague question of a brca mutation in family.      Plan    1. She tells me today she isnt planning to go through with genetic testing. I have suggested we just hold on it for now and rediscuss in a few months.  2. we discussed her oncotype score of 36 and risk of recurrence. There is a statistically significant benefit to the use of adjuvant chemotherapy. We reviewed options for therapy being taxotere/cytoxan for 4 cycles vs AC then Taxol weekly. I reviewed the differences and side effects. After discussion she would like to do taxotere cytoxan. We will work on scheduling this  3. Reschedule radiation consult for at the end of chemotherapy.    Fartun Jackson MD on 2/24/2023 at 4:24 PM

## 2023-02-24 NOTE — LETTER
2/24/2023         RE: Ginger Hernandez  [address] [facility] [address]        Dear Colleague,    Thank you for referring your patient, Ginger Hernandez, to the [facility]. Please see a copy of my visit note below.    [facility] PHYSICIANS  MEDICAL ONCOLOGY    RETURN PATIENT VISIT NOTE    Reason for visit: Breast cancer    Oncology Treatment Summary  1. 11/18/22 DEXA normal  2. 11/18/22 screening mammogram with Right sided mass 2.4 cm  3. 12/2/22 US with 2.3 cm mass and upper limit of normal LN  4. 12/12/22 bx with IDC, ER+MA+ Her2 2+ FISH negative  5. 1/17/23 RIGHT lumpectomy G3 IDC, 3.3 cm 0/1 SLN, ER90%+ MA 20%+ Aas8noc negative via FISH  6. Oncotype recurrence score 36, high risk     HISTORY OF PRESENTING ILLNESS  Pleasant 67 year old seen today for followup of her oncotype recurrence score. She has a lot of arthritis and walks with a cane. 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     PAST MEDICAL HISTORY  Hepatitis C, migraines, HTN, GERD, lumbar fusion, arthritis.      CURRENT OUTPATIENT MEDICATIONS  Current Outpatient Medications   Medication     celecoxib (CELEBREX) 200 MG capsule     Cholecalciferol 250 MCG (73926 UT) CAPS     cycloSPORINE (RESTASIS) 0.05 % ophthalmic emulsion     desoximetasone (TOPICORT) 0.25 % external cream     eletriptan (RELPAX) 40 MG tablet     esomeprazole (NEXIUM) 20 MG DR capsule     fexofenadine (ALLEGRA) 180 MG tablet     fluticasone (FLONASE) 50 MCG/ACT nasal spray     ibuprofen (ADVIL/MOTRIN) 200 MG tablet     ketoconazole (NIZORAL) 2 % external shampoo     lisinopril-hydrochlorothiazide (ZESTORETIC) 10-12.5 MG tablet     omeprazole (PRILOSEC) 20 MG DR capsule     No current facility-administered medications for this visit.        ALLERGIES     Allergies   Allergen Reactions     Diagnostic X-Ray Materials Anaphylaxis  "and Hives     Other reaction(s): Unknown  HUT Comment: xray dye; HUT Reaction: Anaphylaxis; HUT Reaction: Hives; HUT Severity: High; HUT Noted: 20110301  xray dye  xray dye       Ketamine Other (See Comments)     Other reaction(s): Emotional Disturbance, Unknown  \"complete, emotional reaction\"; HUT Comment: \"complete, emotional reaction\"\"complete, emotional reaction\"; HUT Reaction: Emotional Disturbance; HUT Noted: 20180525  \"complete, emotional reaction\"  \"complete, emotional reaction\"  \"complete, emotional reaction\"  Other reaction(s): Emotional Disturbance  \"complete, emotional reaction\"  \"complete, emotional reaction\"       Nabumetone      Other reaction(s): Gastrointestinal, GI Upset, Unknown  gerd; HUT Comment: gerd; HUT Reaction: Gastrointestinal; HUT Noted: 20130123  gerd  Other reaction(s): Gastrointestinal, GI Upset  gerd  gerd       Pregabalin      Other reaction(s): confusion, Confusion, Confusion, Mental Status Change  HUT Reaction: Confusion; HUT Reaction: Confusion; HUT Noted: 20120315  Other reaction(s): Confusion, Mental Status Change          SOCIAL HISTORY  Lives in [place], single, retired, no tobacco, rare etoh, no children.      FAMILY HISTORY  Mother with breast cancer in late 60s, MGM breast cancer in 60s, she was told by a 2nd cousin that they have a brca mutation and was given a piece of paper about it some years ago but has not followed up on it.     REVIEW OF SYSTEMS  Review Of Systems  Skin: negative  Eyes: negative  Ears/Nose/Throat: negative  Respiratory: No shortness of breath, dyspnea on exertion, cough, or hemoptysis  Cardiovascular: negative  Gastrointestinal: negative  Genitourinary: negative  Musculoskeletal: negative  Neurologic: negative  Psychiatric: negative  Hematologic/Lymphatic/Immunologic: negative  Endocrine: negative      PHYSICAL EXAM  B/P: Data Unavailable, T: Data Unavailable, P: Data Unavailable, R: Data Unavailable  Wt Readings from Last 3 Encounters: "   02/07/23 98.4 kg (217 lb)       ECOG PPS0    Physical Exam  Vitals reviewed.   Constitutional:       Appearance: Normal appearance.   HENT:      Head: Normocephalic and atraumatic.   Eyes:      Extraocular Movements: Extraocular movements intact.      Conjunctiva/sclera: Conjunctivae normal.      Pupils: Pupils are equal, round, and reactive to light.   Neurological:      General: No focal deficit present.      Mental Status: She is alert and oriented to person, place, and time. Mental status is at baseline.   Psychiatric:         Behavior: Behavior normal.              LABORATORY AND IMAGING STUDIES  Final Diagnosis     A.  Breast, right,lumpectomy: Invasive grade 3 ductal carcinoma  B.  Lymph node, sentinel, biopsy: 1 lymph node negative for malignancy.  C.  Breast, right, superior margin re-excision: Benign breast parenchyma, negative for malignancy.  D.  Breast, right, medial margin re-excision: Benign breast parenchyma, negative for malignancy.     CAP Synoptic Report  Procedure:  excision (less than total mastectomy)  Specimen Laterality:  right  Histologic Type:  Invasive ductal carcinoma (invasive breast carcinoma of no special type)   Histologic Grade (Carolyn Histologic Score)         Glandular (acinar)/tubular differentiation:  score 3          Nuclear pleomorphism:  score 3          Mitotic rate:  score 3          Overall ndgndrndanddndend:nd nd2nd Tumor Size:   (greatest dimension of largest invasive focus >0.1 cm):   3.3 cm  Tumor Focality:  single focus of invasive carcinoma  Ductal Carcinoma in Situ (DCIS):  present          Architectural pattern(s):  solid         Nuclear grade:  III (high)         Necrosis:  Present, focal (small foci or single cell)         Extensive intraductal component (EIC): negative  Tumor Extent:           Skin:  not present             Nipple:  nipple not present           Skeletal muscle:  not present  Lymphatic and/or Vascular Invasion:  present  Dermal Lymphovascular Invasion:   cannot be determined  Treatment Effect :  No known presurgical therapy  Margins         Invasive carcinoma:  all margins negative for invasive carcinoma               Distance to closest margin(s), specify: 0.1 cm to lateral and anterior margins         DCIS:  all margins negative for DCIS                Distance from DCIS to closest margin (specify): 0.1 cm to lateral margin.               Closest margin(s) to DCIS (required if less than 0.2 cm):  lateral  Regional Lymph Node Status:  all regional lymph nodes negative for tumor 0/1          Total number of lymph nodes examined:  1         Number of sentinel lymph nodes examined:  1  Distant Metastasis:  not applicable  Biomarkers:  performed on previous [facility] biopsy ([id number]); not repeated on current specimen         Estrogen Receptor: Positive (%, strong)         Progesterone Receptor: Positive (20-30%, strong)         HER2: Negative by FISH         Cold Ischemia and Fixation Times:              Meet requirements specified in latest version of the ASCO/CAP guidelines  Tumor block(s) for molecular/send out tests:  A2           ASSESSMENT  AND RECOMMENDATIONS:    1. T2N0M0 ER+NC+Her2 negative IDC s/p RIGHT lumpectomy, high risk oncotype score 36.   2. Family hx of breast cancer with vague question of a brca mutation in family.      Plan    1. She tells me today she isnt planning to go through with genetic testing. I have suggested we just hold on it for now and rediscuss in a few months.  2. we discussed her oncotype score of 36 and risk of recurrence. There is a statistically significant benefit to the use of adjuvant chemotherapy. We reviewed options for therapy being taxotere/cytoxan for 4 cycles vs AC then Taxol weekly. I reviewed the differences and side effects. After discussion she would like to do taxotere cytoxan. We will work on scheduling this  3. Reschedule radiation consult for at the end of chemotherapy.    Fartun Jackson MD on  2/24/2023 at 4:24 PM            Again, thank you for allowing me to participate in the care of your patient.        Sincerely,        Fartun Jackson MD

## 2023-03-01 ENCOUNTER — PATIENT OUTREACH (OUTPATIENT)
Dept: CARE COORDINATION | Facility: CLINIC | Age: 68
End: 2023-03-01

## 2023-03-01 NOTE — PROGRESS NOTES
Social Work Intervention  [facility]    Data/Intervention:    Patient Name:  Ginger Hernandez  /Age:  1955 (67 year old)    Visit Type: telephone  Referral Source: Fabiola Rader  Reason for Referral:  Emotional support    Collaborated With:    Ginger    Psychosocial Information/Concerns:  SW received referral that Ginger is scheduled to start treatment tomorrow    Intervention/Education/Resources Provided:  JESSICA called and spoke to Ginger this morning, introducing self and reason for call. Ginger sounded quite overwhelmed at times. She has very limited support in the area and had hoped to move closer to family, but feels stuck here now that she is starting treatment. SW listened, validated Ginger's feelings, and provided some brief supportive counseling.     Ginger is facing this cancer diagnosis in the context of a recent history of a variety of medical concerns. She has gone on disability and is managing pain from her arthritis and problems she feels are related to recent surgeries. JESSICA continued to provide empathic listening. Ginger does feel limited in what she is able to manage for her own needs at home. She has been needing to pay for assistance in the home, this is not a huge strain for her, but still hard feeling like she doesn't have people that can be there for her.     JESSICA and Ginger spoke about long term goals. Ginger understands that the goal is to get through treatment and then she should be ok. This won't be forever. JESSICA reminded Ginger that she can still make plans and think about moving closer to family. Ginger states her understanding.     Ginger reports that just being able to talk about her feelings has been a big help. JESSICA suggested she could remain available and plan to call Ginger next week, Ginger agreed she would appreciate that.     At this time, Ginger does not want referrals to support groups or mentor programs. No other resource needs identified today.     Assessment/Plan:  JESSICA  will plan to call Ginger next week to check in and follow up on support needs.     Provided patient/family with contact information and availability.    Fartun Han MSW, LICSW, OSW-C (she/her)  Clinical , Adult Oncology  Phone: [phone]    [place] (M, W, F)  [place] (Thu)

## 2023-03-02 ENCOUNTER — LAB (OUTPATIENT)
Dept: LAB | Facility: CLINIC | Age: 68
End: 2023-03-02
Payer: COMMERCIAL

## 2023-03-02 ENCOUNTER — INFUSION THERAPY VISIT (OUTPATIENT)
Dept: INFUSION THERAPY | Facility: CLINIC | Age: 68
End: 2023-03-02
Payer: COMMERCIAL

## 2023-03-02 VITALS
WEIGHT: 209.4 LBS | OXYGEN SATURATION: 96 % | RESPIRATION RATE: 12 BRPM | SYSTOLIC BLOOD PRESSURE: 145 MMHG | BODY MASS INDEX: 35.75 KG/M2 | HEART RATE: 98 BPM | DIASTOLIC BLOOD PRESSURE: 78 MMHG | HEIGHT: 64 IN | TEMPERATURE: 98.7 F

## 2023-03-02 DIAGNOSIS — Z17.0 MALIGNANT NEOPLASM OF AREOLA OF RIGHT BREAST IN FEMALE, ESTROGEN RECEPTOR POSITIVE (H): Primary | ICD-10-CM

## 2023-03-02 DIAGNOSIS — C50.011 MALIGNANT NEOPLASM OF AREOLA OF RIGHT BREAST IN FEMALE, ESTROGEN RECEPTOR POSITIVE (H): Primary | ICD-10-CM

## 2023-03-02 LAB
ALBUMIN SERPL-MCNC: 4.2 G/DL (ref 3.4–5)
ALP SERPL-CCNC: 61 U/L (ref 40–150)
ALT SERPL W P-5'-P-CCNC: 62 U/L (ref 0–50)
ANION GAP SERPL CALCULATED.3IONS-SCNC: 6 MMOL/L (ref 3–14)
AST SERPL W P-5'-P-CCNC: 28 U/L (ref 0–45)
BASOPHILS # BLD AUTO: 0.1 10E3/UL (ref 0–0.2)
BASOPHILS NFR BLD AUTO: 1 %
BILIRUB SERPL-MCNC: 0.5 MG/DL (ref 0.2–1.3)
BUN SERPL-MCNC: 15 MG/DL (ref 7–30)
CALCIUM SERPL-MCNC: 9.6 MG/DL (ref 8.5–10.1)
CHLORIDE BLD-SCNC: 106 MMOL/L (ref 94–109)
CO2 SERPL-SCNC: 25 MMOL/L (ref 20–32)
CREAT SERPL-MCNC: 0.81 MG/DL (ref 0.52–1.04)
EOSINOPHIL # BLD AUTO: 0.3 10E3/UL (ref 0–0.7)
EOSINOPHIL NFR BLD AUTO: 3 %
ERYTHROCYTE [DISTWIDTH] IN BLOOD BY AUTOMATED COUNT: 13 % (ref 10–15)
GFR SERPL CREATININE-BSD FRML MDRD: 79 ML/MIN/1.73M2
GLUCOSE BLD-MCNC: 188 MG/DL (ref 70–99)
HCT VFR BLD AUTO: 49 % (ref 35–47)
HGB BLD-MCNC: 16.6 G/DL (ref 11.7–15.7)
HOLD SPECIMEN: NORMAL
IMM GRANULOCYTES # BLD: 0 10E3/UL
IMM GRANULOCYTES NFR BLD: 0 %
LYMPHOCYTES # BLD AUTO: 3.7 10E3/UL (ref 0.8–5.3)
LYMPHOCYTES NFR BLD AUTO: 37 %
MCH RBC QN AUTO: 29.4 PG (ref 26.5–33)
MCHC RBC AUTO-ENTMCNC: 33.9 G/DL (ref 31.5–36.5)
MCV RBC AUTO: 87 FL (ref 78–100)
MONOCYTES # BLD AUTO: 0.9 10E3/UL (ref 0–1.3)
MONOCYTES NFR BLD AUTO: 9 %
NEUTROPHILS # BLD AUTO: 5.1 10E3/UL (ref 1.6–8.3)
NEUTROPHILS NFR BLD AUTO: 50 %
NRBC # BLD AUTO: 0 10E3/UL
NRBC BLD AUTO-RTO: 0 /100
PLATELET # BLD AUTO: 282 10E3/UL (ref 150–450)
POTASSIUM BLD-SCNC: 4 MMOL/L (ref 3.4–5.3)
PROT SERPL-MCNC: 7.7 G/DL (ref 6.8–8.8)
RBC # BLD AUTO: 5.64 10E6/UL (ref 3.8–5.2)
SODIUM SERPL-SCNC: 137 MMOL/L (ref 133–144)
WBC # BLD AUTO: 10.1 10E3/UL (ref 4–11)

## 2023-03-02 PROCEDURE — 85025 COMPLETE CBC W/AUTO DIFF WBC: CPT | Performed by: INTERNAL MEDICINE

## 2023-03-02 PROCEDURE — 96367 TX/PROPH/DG ADDL SEQ IV INF: CPT | Performed by: INTERNAL MEDICINE

## 2023-03-02 PROCEDURE — 80053 COMPREHEN METABOLIC PANEL: CPT | Performed by: INTERNAL MEDICINE

## 2023-03-02 PROCEDURE — 96377 APPLICATON ON-BODY INJECTOR: CPT | Mod: 59 | Performed by: INTERNAL MEDICINE

## 2023-03-02 PROCEDURE — 36415 COLL VENOUS BLD VENIPUNCTURE: CPT | Performed by: INTERNAL MEDICINE

## 2023-03-02 PROCEDURE — 99207 PR NO CHARGE LOS: CPT

## 2023-03-02 PROCEDURE — 96413 CHEMO IV INFUSION 1 HR: CPT | Performed by: INTERNAL MEDICINE

## 2023-03-02 PROCEDURE — 96417 CHEMO IV INFUS EACH ADDL SEQ: CPT | Performed by: INTERNAL MEDICINE

## 2023-03-02 PROCEDURE — 96375 TX/PRO/DX INJ NEW DRUG ADDON: CPT | Performed by: INTERNAL MEDICINE

## 2023-03-02 RX ORDER — PROCHLORPERAZINE MALEATE 10 MG
5 TABLET ORAL EVERY 6 HOURS PRN
Qty: 30 TABLET | Refills: 2 | Status: SHIPPED | OUTPATIENT
Start: 2023-03-02 | End: 2023-05-22

## 2023-03-02 RX ORDER — ZOLPIDEM TARTRATE 10 MG/1
10 TABLET ORAL
COMMUNITY
Start: 2021-05-04

## 2023-03-02 RX ORDER — PENCICLOVIR 10 MG/G
CREAM TOPICAL
COMMUNITY
End: 2023-07-18

## 2023-03-02 RX ORDER — RIZATRIPTAN BENZOATE 10 MG/1
1 TABLET ORAL
COMMUNITY
End: 2023-07-18

## 2023-03-02 RX ORDER — SUMATRIPTAN 20 MG/1
SPRAY NASAL
COMMUNITY
Start: 2021-05-04

## 2023-03-02 RX ORDER — ONDANSETRON 2 MG/ML
8 INJECTION INTRAMUSCULAR; INTRAVENOUS ONCE
Status: COMPLETED | OUTPATIENT
Start: 2023-03-02 | End: 2023-03-02

## 2023-03-02 RX ORDER — VALACYCLOVIR HYDROCHLORIDE 1 G/1
1 TABLET, FILM COATED ORAL
COMMUNITY
Start: 2023-01-30

## 2023-03-02 RX ORDER — MAGNESIUM 200 MG
TABLET ORAL
COMMUNITY

## 2023-03-02 RX ORDER — DEXAMETHASONE 4 MG/1
8 TABLET ORAL 2 TIMES DAILY WITH MEALS
Qty: 6 TABLET | Refills: 3 | Status: SHIPPED | OUTPATIENT
Start: 2023-03-02 | End: 2023-05-22

## 2023-03-02 RX ORDER — ONDANSETRON 8 MG/1
8 TABLET, FILM COATED ORAL EVERY 8 HOURS PRN
Qty: 30 TABLET | Refills: 2 | Status: SHIPPED | OUTPATIENT
Start: 2023-03-02 | End: 2023-05-22

## 2023-03-02 RX ORDER — METRONIDAZOLE 10 MG/G
GEL TOPICAL
COMMUNITY

## 2023-03-02 RX ORDER — CLOBETASOL PROPIONATE 0.5 MG/G
OINTMENT TOPICAL
COMMUNITY
Start: 2022-04-14

## 2023-03-02 RX ADMIN — Medication 250 ML: at 13:04

## 2023-03-02 RX ADMIN — ONDANSETRON 8 MG: 2 INJECTION INTRAMUSCULAR; INTRAVENOUS at 13:18

## 2023-03-02 NOTE — PROGRESS NOTES
"SPIRITUAL HEALTH SERVICES Progress Note   [facility] - Infusion    Referral: Saw Ptnt Ginger A Vrubley for emotional support at first chemo session. I introduced myself and  Services.    Patient/Family Understanding of Illness and Goals of Care -     Ginger shared she is the third generation to have this cancer, as well as some distant cousins; she spoke of recent surgery, now facing chemo, and possibly radiation later.     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     She \"likes science because she is a curious person\" who can keep asking questions, which informs her views on her cancer experience.    Distress and Loss -     Ginger grew tearful as she spoke of accompanying her mother thru her treatments and named the renewed emotions occurring, to be expected and also \"it's one thing to know it will happen, and it's another thing to fully live into it.\"     She spoke at length of her genetic testing encounters, where she felt \"abused,\" explaining that statistics weren't helpful to someone in her situation, already diagnosed and simply seeking a treatment plan. She considered if there was an emotional component to her reaction.    Strengths, Coping, and Resources -     Ginger is considering ways she might support changes in the genetic testing patient information processes; she shared she'd already spoken with a patient advocate.    She lives in a townhouse with supportive neighbors she's known for a long time.    She shared she is a \"nature nerd\" and told stories of deer, butterflies and birds in different places she's lived.    Meaning, Beliefs, and Spirituality -     She acknowledged tender feelings around her grandmother, mother and now her having the same cancer; she spoke of neuroscience, gut biome and brain connections, and the book \"The Body Keeps the Score.\"    Visit ended for cares    Plan of Care -     I offered words of affirmation " and encouragement, invited discernment, normalized experiences, and said a blessing.    Ginger welcomed my offer of more visits.    SH will follow.    Rev Daniella Clark  Associate Chaplain [facility] Phone Line [phone]  [place] (Tuesdays & Thursdays) [phone]

## 2023-03-02 NOTE — PROGRESS NOTES
Infusion Nursing Note:  Ginger Hernandez presents today for C1D1 Taxotere, Cytoxan and Neulasta Onpro.    Patient seen by provider today: No   present during visit today: Not Applicable.    Note:   Oriented patient to the infusion department, including how/when to use the call light.     Pharmacist visited with patient regarding at home medications (Zofran, Compazine and Decadron.)    Patient likes to learn the mechanism of action with medications as she has a chemistry background.    Intravenous Access:  Peripheral IV placed.    Treatment Conditions:  Lab Results   Component Value Date    HGB 16.6 (H) 03/02/2023    WBC 10.1 03/02/2023    ANEUTAUTO 5.1 03/02/2023     03/02/2023      Lab Results   Component Value Date     03/02/2023    POTASSIUM 4.0 03/02/2023    CR 0.81 03/02/2023    IBETH 9.6 03/02/2023    BILITOTAL 0.5 03/02/2023    ALBUMIN 4.2 03/02/2023    ALT 62 (H) 03/02/2023    AST 28 03/02/2023     Results reviewed, labs MET treatment parameters, ok to proceed with treatment.    Post Infusion Assessment:  Patient tolerated infusion without incident.  Blood return noted pre and post infusion.  Site patent and intact, free from redness, edema or discomfort.  No evidence of extravasations.  Access discontinued per protocol.     Discharge Plan:   AVS to patient via MYCHART.  Patient will return 3/23/23 for next appointment.   Patient discharged in stable condition accompanied by: self.  Departure Mode: Ambulatory.      Jeni Giles RN

## 2023-03-06 ENCOUNTER — PATIENT OUTREACH (OUTPATIENT)
Dept: ONCOLOGY | Facility: CLINIC | Age: 68
End: 2023-03-06
Payer: COMMERCIAL

## 2023-03-06 NOTE — PROGRESS NOTES
[facility]: Cancer Care Long Assessment    Discussion with Patient:                                                      Call placed to patient to follow up on C1D1 TC on 3/2/23.                               Dates of Treament:                                                      Infusion given in last 28 days     Administered MAR Action Medication Dose Rate Visit    03/02/2023 13:56 New Bag DOCEtaxel (TAXOTERE) 158 mg in sodium chloride in non-PVC container 0.9 % 282.9 mL infusion 158 mg 282.9 mL/hr Infusion Therapy Visit on 03/02/2023 in [facility]    03/02/2023 15:03 New Bag cyclophosphamide (CYTOXAN) 1,260 mg in sodium chloride 0.9 % 338 mL infusion 1,260 mg 676 mL/hr Infusion Therapy Visit on 03/02/2023 in [facility]          Assessment:                                                      Assessment completed with:: Patient    Constitutional  Patient Reported Constitutional Symptoms?: Yes  Fatigue: Fatigue not relieved by rest OR limiting instrumental ADL  Fever: Absent or within normal limits  Chills: Absent or within normal limits  Weight Gain: Absent or within normal limits  Weight Loss: Absent or within normal limits  Hot Flashes: Absent or within normal limits    Neurosensory  Patient Reported Neurosensory Symptoms?: No    Eye Disorders  Dry Eye: Absent or within normal limits    Ear Disorders  Ear Disorders  Patient Reported Ear Disorder?: No    Cardiovascular  Patient Reported Cardiovascular Symptoms?: Yes  Palpitations: Mild symptoms OR intervention not indicated  Phlebitis: Absent or within normal limits  VTE History: 0-->indicator not present    Respiratory       Gastrointestinal  Patient Reported Gastrointestinal Symptoms?: Yes  Anorexia: Oral intake altered without significant weight loss or malnutrition OR oral nutritional supplements indicated  Nausea: Absent or within normal limits  Vomiting: Absent or within normal  limits  Dehydration: Absent or within normal limits  Dysgeusia: Absent or within normal limits  Dysphagia: Absent or within normal limits  Mucositis Oral: Absent or within normal limits  Esophagitis: Absent or within normal limits  Constipation: Absent or within normal limits  Diarrhea: Absent or within normal limits  Pharyngitis: Absent or within normal limits  Dry Mouth: Absent or within normal limits    Genitourinary  Patient Reported Genitourinary Symptoms?: No    Lymph System Disorders  Patient Reported Lymph System Disorders?: No    Musculoskeletal and Connective Tissue Disorders  Patient Reported Musculoskeletal or Connective Tissue Disorders?: Yes  Arthralgia: Mild pain  Bone Pain: Mild pain  Generalized Muscle Weakness: Symptomatic OR evident on physical exam OR limiting instrumental ADL  Myalgia: Mild pain    Integumentary  Patient Reported Integumentary Symptoms?: No    Pain  Patient Reported Pain?: Yes, is currently well-managed    Patient Coping  Anxiety;Fearful;Depression    Clinic Utilization  Patient Aware of Next Appointment?: Yes    Other Patient Concerns       No assessment indicated    Intervention/Education provided during outreach:                                                       Patient to follow up as scheduled at next appt  Patient to call/"[employer]hart message with updates  Confirmed patient has clinic and triage numbers         Signature:  Jennifer Cole RN

## 2023-03-07 ENCOUNTER — PATIENT OUTREACH (OUTPATIENT)
Dept: CARE COORDINATION | Facility: CLINIC | Age: 68
End: 2023-03-07

## 2023-03-07 NOTE — PROGRESS NOTES
"Social Work Follow-Up Encounter Visit  Oncology Clinic    Data/Intervention:  Patient Name:  Ginger Hernandez  /Age:  1955 (67 year old)    Reason for Follow-Up:  Post C1D1 check in    Collaborated With:    Ginger    Resources Provided:  Supportive counseling    Assessment:  SW called and spoke to Ginger this afternoon, following up on previous call and checking in on how Ginger has been doing. SW acknowledged her understanding that Ginger hadn't been feeling well yesterday. Ginger spoke about how she has been feeling and notes she thinks she is feeling a little bit better today. Ginger continues to feel quite isolated and acknowledges that she is having a hard time adjusting to treatment. She is uncertain if she wants to continue with all cycles of treatment. She thinks in terms of her medical history and wonders if the long term pay off of treatment is worth the way she feels. She states she wants to talk further with Dr. Jackson and get some specific numbers and percentages for risk of recurrence with and without treatment. SW agreed it was important to have this conversations with her doctor.     Much of the discussion today was SW listening and providing validation as Ginger spoke about her life and challenges she has faced. Ginger is not making any decisions about goals of care at this time, but remains open and working on speaking with her care team.    Later, Ginger started to cry and apologized for \"unloading\" on . SW assured her this was no problem, that this was a natural part of the process. Ginger had spoke quite a bit about filtering the chemo from her system and SW compared that goal to feelings, that it is important to let them out. Ginger appreciative and SW encouraged Ginger to reach out at any time.     Plan:  Previously provided patient/family with writer's contact information and availability.   SW will continue to remain available as needed and appropriate.       Fartun Han, " MSW, LICSW, OSW-C (she/her)  Clinical , Adult Oncology  Phone: [phone]    [place] (MICHI, W, F)  [place] (Thu)

## 2023-03-21 NOTE — PROGRESS NOTES
Oncology Follow Up Visit: March 22, 2023    Oncologist: Dr Fartun Jackson  PCP: No Ref-Primary, Physician    Diagnosis: Stage 2A Right Breast Cancer (T2M0N0)  Ginger Hernandez is a 67 yo female who had a screening mammogram on 11/18/2022 which noted a Right sided mass 2.4 cm  12/2/22 US with 2.3 cm mass and upper limit of normal LN  12/12/22 bx with IDC, ER+MA+ Her2 2+ FISH negative  1/17/23 RIGHT lumpectomy G3 IDC, 3.3 cm 0/1 SLN, ER90%+ MA 20%+ [id number] negative via FISH  Oncotype recurrence score 36, high risk  Treatment:   1/17/2022 Right lumpectomy with SLN biopsy  3/2/2023 beganTaxotere/Cytoxan    Interval History: Ms Hernandez comes to clinic for review prior to continuation of TC to treat her breast cancer with cycle 2. Pt reports she continues with some weakness and fatigue.She feels she is eating well with no nausea keeping her from eating. Bowels noted to be variable. No skin changes but mentions she has had a mouth sore with first cycle. Denies new pains but walking is not getting easier. Has neuropathy From her diabetes and denies finger neuropathy for now.   Rest of comprehensive and complete ROS is reviewed and is negative.   Past Medical History:   Diagnosis Date     Breast cancer (H) 12/12/2022    Right Breast     Current Outpatient Medications   Medication     celecoxib (CELEBREX) 200 MG capsule     Cholecalciferol 250 MCG (88174 UT) CAPS     clobetasol (TEMOVATE) 0.05 % external ointment     cycloSPORINE (RESTASIS) 0.05 % ophthalmic emulsion     desoximetasone (TOPICORT) 0.25 % external cream     eletriptan (RELPAX) 40 MG tablet     esomeprazole (NEXIUM) 20 MG DR capsule     fexofenadine (ALLEGRA) 180 MG tablet     fluticasone (FLONASE) 50 MCG/ACT nasal spray     ibuprofen (ADVIL/MOTRIN) 200 MG tablet     ketoconazole (NIZORAL) 2 % external shampoo     lisinopril-hydrochlorothiazide (ZESTORETIC) 10-12.5 MG tablet     magnesium 200 MG TABS     metroNIDAZOLE (METROGEL) 1 % external gel     Misc Natural  "Products (LUTEIN 20 PO)     omeprazole (PRILOSEC) 20 MG DR capsule     ondansetron (ZOFRAN) 8 MG tablet     penciclovir (DENAVIR) 1 % external cream     prochlorperazine (COMPAZINE) 10 MG tablet     rizatriptan (MAXALT) 10 MG tablet     SUMAtriptan (IMITREX) 20 MG/ACT nasal spray     valACYclovir (VALTREX) 1000 mg tablet     zolpidem (AMBIEN) 10 MG tablet     dexamethasone (DECADRON) 4 MG tablet     No current facility-administered medications for this visit.     Facility-Administered Medications Ordered in Other Visits   Medication     heparin 100 UNIT/ML injection 500 Units     sodium chloride (PF) 0.9% PF flush 10 mL     Allergies   Allergen Reactions     Diagnostic X-Ray Materials Anaphylaxis and Hives     Other reaction(s): Unknown  HUT Comment: xray dye; HUT Reaction: Anaphylaxis; HUT Reaction: Hives; HUT Severity: High; HUT Noted: 20110301  xray dye  xray dye       Ketamine Other (See Comments)     Other reaction(s): Emotional Disturbance, Unknown  \"complete, emotional reaction\"; HUT Comment: \"complete, emotional reaction\"\"complete, emotional reaction\"; HUT Reaction: Emotional Disturbance; HUT Noted: 20180525  \"complete, emotional reaction\"  \"complete, emotional reaction\"  \"complete, emotional reaction\"  Other reaction(s): Emotional Disturbance  \"complete, emotional reaction\"  \"complete, emotional reaction\"       Nabumetone      Other reaction(s): Gastrointestinal, GI Upset, Unknown  gerd; HUT Comment: gerd; HUT Reaction: Gastrointestinal; HUT Noted: 20130123  gerd  Other reaction(s): Gastrointestinal, GI Upset  gerd  gerd       Pregabalin      Other reaction(s): confusion, Confusion, Confusion, Mental Status Change  HUT Reaction: Confusion; HUT Reaction: Confusion; HUT Noted: 20120315  Other reaction(s): Confusion, Mental Status Change         Physical Exam:BP (!) 150/80 (BP Location: Right arm, Patient Position: Chair, Cuff Size: Adult Large)   Pulse 107   Ht 1.621 m (5' 3.8\")   Wt 94.3 kg (208 lb)   " SpO2 96%   BMI 35.93 kg/m     ECOG PS- 1  Constitutional: Alert and in no distress. Cooperative. Obese.  ENT: Eyes bright, No mouth sores at present. Wears hearing aids.   Neck: Supple, No adenopathy.  Cardiac: Heart rate and rhythm is regular and strong without murmur  Respiratory: Breathing easy. Lung sounds clear to auscultation  Breasts:right breast without new mass or tenderness.   GI: Abdomen is rounded, soft, non-tender, BS normal. No masses or organomegaly  MS: Muscle tone fair- using cane to get around but has stilted walking.  extremities normal with no edema.   Skin: No suspicious lesions or rashes  Neuro: Sensory grossly WNL, gait stilted..   Lymph: Normal ant/post cervical, axillary, supraclavicular nodes  Psych: Mentation appears normal and affect normal/bright with good conversation.     Laboratory/Imaging Results:   Results for orders placed or performed in visit on 03/22/23   Comprehensive metabolic panel     Status: Abnormal   Result Value Ref Range    Sodium 133 133 - 144 mmol/L    Potassium 3.8 3.4 - 5.3 mmol/L    Chloride 103 94 - 109 mmol/L    Carbon Dioxide (CO2) 24 20 - 32 mmol/L    Anion Gap 6 3 - 14 mmol/L    Urea Nitrogen 20 7 - 30 mg/dL    Creatinine 0.84 0.52 - 1.04 mg/dL    Calcium 9.3 8.5 - 10.1 mg/dL    Glucose 231 (H) 70 - 99 mg/dL    Alkaline Phosphatase 66 40 - 150 U/L    AST 23 0 - 45 U/L    ALT 70 (H) 0 - 50 U/L    Protein Total 7.4 6.8 - 8.8 g/dL    Albumin 4.0 3.4 - 5.0 g/dL    Bilirubin Total 0.8 0.2 - 1.3 mg/dL    GFR Estimate 75 >60 mL/min/1.73m2   CBC with platelets and differential     Status: Abnormal   Result Value Ref Range    WBC Count 8.3 4.0 - 11.0 10e3/uL    RBC Count 5.32 (H) 3.80 - 5.20 10e6/uL    Hemoglobin 15.3 11.7 - 15.7 g/dL    Hematocrit 46.1 35.0 - 47.0 %    MCV 87 78 - 100 fL    MCH 28.8 26.5 - 33.0 pg    MCHC 33.2 31.5 - 36.5 g/dL    RDW 13.5 10.0 - 15.0 %    Platelet Count 309 150 - 450 10e3/uL    % Neutrophils 67 %    % Lymphocytes 23 %    % Monocytes  9 %    % Eosinophils 0 %    % Basophils 1 %    % Immature Granulocytes 0 %    NRBCs per 100 WBC 0 <1 /100    Absolute Neutrophils 5.5 1.6 - 8.3 10e3/uL    Absolute Lymphocytes 1.9 0.8 - 5.3 10e3/uL    Absolute Monocytes 0.7 0.0 - 1.3 10e3/uL    Absolute Eosinophils 0.0 0.0 - 0.7 10e3/uL    Absolute Basophils 0.1 0.0 - 0.2 10e3/uL    Absolute Immature Granulocytes 0.0 <=0.4 10e3/uL    Absolute NRBCs 0.0 10e3/uL   CBC with platelets differential     Status: Abnormal    Narrative    The following orders were created for panel order CBC with platelets differential.  Procedure                               Abnormality         Status                     ---------                               -----------         ------                     CBC with platelets and d...[[id number]]  Abnormal            Final result                 Please view results for these tests on the individual orders.     Assessment and Plan:   Stage 2a Right Breast Cancer- Pt began treatment with TC on 3/2. She seems to have tolerated first cycle well with minimal side effects. Her blood sugars have elevated and she is noting fatigue from plan but has recuperated well and is acceptable for treatment.   We will see her prior to each treatment.   Will have radiation consult after TCx 4 completed.  Elevated glucose- Pt does have not have diagnosis of diabetes but is aware that her blood sugars do range high. Suggest we will need to watch closely and may need to talk with PCP if continues with elevation.   The total time of this encounter amounted to  40 minutes. This time included face toface time spent with the patient, prep work, ordering tests, and performing post visit documentation.  Carissa Padilla,Cnp

## 2023-03-22 ENCOUNTER — ONCOLOGY VISIT (OUTPATIENT)
Dept: ONCOLOGY | Facility: CLINIC | Age: 68
End: 2023-03-22
Attending: INTERNAL MEDICINE
Payer: COMMERCIAL

## 2023-03-22 ENCOUNTER — PATIENT OUTREACH (OUTPATIENT)
Dept: CARE COORDINATION | Facility: CLINIC | Age: 68
End: 2023-03-22

## 2023-03-22 ENCOUNTER — APPOINTMENT (OUTPATIENT)
Dept: LAB | Facility: CLINIC | Age: 68
End: 2023-03-22
Payer: COMMERCIAL

## 2023-03-22 ENCOUNTER — INFUSION THERAPY VISIT (OUTPATIENT)
Dept: INFUSION THERAPY | Facility: CLINIC | Age: 68
End: 2023-03-22
Payer: COMMERCIAL

## 2023-03-22 VITALS
HEART RATE: 107 BPM | BODY MASS INDEX: 35.51 KG/M2 | WEIGHT: 208 LBS | DIASTOLIC BLOOD PRESSURE: 80 MMHG | SYSTOLIC BLOOD PRESSURE: 150 MMHG | HEIGHT: 64 IN | OXYGEN SATURATION: 96 %

## 2023-03-22 DIAGNOSIS — R11.0 CHEMOTHERAPY-INDUCED NAUSEA: ICD-10-CM

## 2023-03-22 DIAGNOSIS — C50.011 MALIGNANT NEOPLASM OF AREOLA OF RIGHT BREAST IN FEMALE, ESTROGEN RECEPTOR POSITIVE (H): Primary | ICD-10-CM

## 2023-03-22 DIAGNOSIS — Z80.3 FAMILY HISTORY OF MALIGNANT NEOPLASM OF BREAST: ICD-10-CM

## 2023-03-22 DIAGNOSIS — Z17.0 MALIGNANT NEOPLASM OF AREOLA OF RIGHT BREAST IN FEMALE, ESTROGEN RECEPTOR POSITIVE (H): Primary | ICD-10-CM

## 2023-03-22 DIAGNOSIS — M25.50 MULTIPLE JOINT PAIN: ICD-10-CM

## 2023-03-22 DIAGNOSIS — T45.1X5A CHEMOTHERAPY-INDUCED NAUSEA: ICD-10-CM

## 2023-03-22 DIAGNOSIS — K76.0 FATTY LIVER DISEASE, NONALCOHOLIC: ICD-10-CM

## 2023-03-22 PROCEDURE — 96367 TX/PROPH/DG ADDL SEQ IV INF: CPT | Performed by: NURSE PRACTITIONER

## 2023-03-22 PROCEDURE — 99207 PR NO CHARGE LOS: CPT

## 2023-03-22 PROCEDURE — 96375 TX/PRO/DX INJ NEW DRUG ADDON: CPT | Performed by: NURSE PRACTITIONER

## 2023-03-22 PROCEDURE — 96417 CHEMO IV INFUS EACH ADDL SEQ: CPT | Performed by: NURSE PRACTITIONER

## 2023-03-22 PROCEDURE — 99215 OFFICE O/P EST HI 40 MIN: CPT | Mod: 25 | Performed by: NURSE PRACTITIONER

## 2023-03-22 PROCEDURE — 96413 CHEMO IV INFUSION 1 HR: CPT | Performed by: NURSE PRACTITIONER

## 2023-03-22 PROCEDURE — 96377 APPLICATON ON-BODY INJECTOR: CPT | Mod: 59 | Performed by: NURSE PRACTITIONER

## 2023-03-22 RX ORDER — ONDANSETRON 2 MG/ML
8 INJECTION INTRAMUSCULAR; INTRAVENOUS ONCE
Status: CANCELLED | OUTPATIENT
Start: 2023-03-23

## 2023-03-22 RX ORDER — ALBUTEROL SULFATE 90 UG/1
1-2 AEROSOL, METERED RESPIRATORY (INHALATION)
Status: CANCELLED
Start: 2023-03-23

## 2023-03-22 RX ORDER — ALBUTEROL SULFATE 0.83 MG/ML
2.5 SOLUTION RESPIRATORY (INHALATION)
Status: CANCELLED | OUTPATIENT
Start: 2023-03-23

## 2023-03-22 RX ORDER — MEPERIDINE HYDROCHLORIDE 25 MG/ML
25 INJECTION INTRAMUSCULAR; INTRAVENOUS; SUBCUTANEOUS EVERY 30 MIN PRN
Status: CANCELLED | OUTPATIENT
Start: 2023-03-23

## 2023-03-22 RX ORDER — DIPHENHYDRAMINE HYDROCHLORIDE 50 MG/ML
50 INJECTION INTRAMUSCULAR; INTRAVENOUS
Status: CANCELLED
Start: 2023-03-23

## 2023-03-22 RX ORDER — ONDANSETRON 2 MG/ML
8 INJECTION INTRAMUSCULAR; INTRAVENOUS ONCE
Status: COMPLETED | OUTPATIENT
Start: 2023-03-22 | End: 2023-03-22

## 2023-03-22 RX ORDER — METHYLPREDNISOLONE SODIUM SUCCINATE 125 MG/2ML
125 INJECTION, POWDER, LYOPHILIZED, FOR SOLUTION INTRAMUSCULAR; INTRAVENOUS
Status: CANCELLED
Start: 2023-03-23

## 2023-03-22 RX ORDER — EPINEPHRINE 1 MG/ML
0.3 INJECTION, SOLUTION INTRAMUSCULAR; SUBCUTANEOUS EVERY 5 MIN PRN
Status: CANCELLED | OUTPATIENT
Start: 2023-03-23

## 2023-03-22 RX ORDER — HEPARIN SODIUM (PORCINE) LOCK FLUSH IV SOLN 100 UNIT/ML 100 UNIT/ML
5 SOLUTION INTRAVENOUS
Status: CANCELLED | OUTPATIENT
Start: 2023-03-23

## 2023-03-22 RX ORDER — HEPARIN SODIUM,PORCINE 10 UNIT/ML
5 VIAL (ML) INTRAVENOUS
Status: CANCELLED | OUTPATIENT
Start: 2023-03-23

## 2023-03-22 RX ADMIN — ONDANSETRON 8 MG: 2 INJECTION INTRAMUSCULAR; INTRAVENOUS at 12:05

## 2023-03-22 RX ADMIN — Medication 250 ML: at 12:05

## 2023-03-22 ASSESSMENT — PAIN SCALES - GENERAL: PAINLEVEL: NO PAIN (0)

## 2023-03-22 NOTE — NURSING NOTE
"Oncology Rooming Note    March 22, 2023 10:56 AM   Ginger Hernandez is a 68 year old female who presents for:    Chief Complaint   Patient presents with     Oncology Clinic Visit     Follow up     Initial Vitals: BP (!) 150/80 (BP Location: Right arm, Patient Position: Chair, Cuff Size: Adult Large)   Pulse 107   Ht 1.621 m (5' 3.8\")   Wt 94.3 kg (208 lb)   SpO2 96%   BMI 35.93 kg/m   Estimated body mass index is 35.93 kg/m  as calculated from the following:    Height as of this encounter: 1.621 m (5' 3.8\").    Weight as of this encounter: 94.3 kg (208 lb). Body surface area is 2.06 meters squared.  No Pain (0) Comment: Data Unavailable   No LMP recorded.  Allergies reviewed: Yes  Medications reviewed: Yes    Medications: Medication refills not needed today.  Pharmacy name entered into [place]: [facility] - [facility] [id number]    Clinical concerns: NO Carissa was notified.      Krysta Estes CMA              "

## 2023-03-22 NOTE — PROGRESS NOTES
Social Work Follow-Up Encounter Visit  Oncology Clinic    Data/Intervention:  Patient Name:  Ginger Hernandez  /Age:  1955 (68 year old)    Reason for Follow-Up:  Psychosocial follow up    Collaborated With:    Ginger    Resources Provided:  Psychosocial support    Assessment:  Ginger is here today for C2 Taxotere + Cytoxan + onpro. JESSICA met with Ginger in the infusion clinic. Ginger states she is doing ok. She had some questions about drivers license renewal, she states with the diagnosis and start of treatment she did get behind on some things she needed to do, including renewing her drivers license. She understands she can request an extension and asked if JESSICA could assist with this. JESSICA explained she would need to see what was required and who was eligible to submit the request. Ginger plans to look at whether this is a form or a letter that is needed and will update JESSICA.     JESSICA and Ginger spent some more time speaking about adjustment to illness and treatment. JESSICA spoke about Ginger's mom. Ginger got tearful almost immediately. She notes that she does not feel particularly worried about how own mortality related to this illness, she understands that it is treatable and she will do well, but it does bring up a lot of feelings and memories from when her mom went through treatment and eventually passed away from her breast cancer. JESSICA and Ginger spoke about genetic testing. She had a negative experience with genetic counselors at [facility] and she does not want to go through that again. She notes she wouldn't mind having the information, but felt very frustrated having to sit through the statistics about cancer risk after she was already diagnosed. JESSICA encouraged Ginger to keep thinking about this decision and JESSICA notes that if Ginger decides to pursue testing, JESSICA can reach out to the genetic counseling team to share her concerns and they limit the appointment as much as possible to just getting the testing done. Ginger  agreed to keep SW updated.       Plan:  Previously provided patient/family with writer's contact information and availability.   SW will continue to remain available as needed and appropriate.     Fartun Han, MSW, LICSW, OSW-C (she/her)  Clinical , Adult Oncology  Phone: [phone]    [place] (M, W, F)  [place] (Thu)

## 2023-03-22 NOTE — PROGRESS NOTES
"Infusion Nursing Note:  Ginger Hernandez presents today for C2 Taxotere + Cytoxan + onpro.    Patient seen by provider today: Yes: Carissa Padilla NP   present during visit today: Not Applicable.    Note: Patient reported have some \"awful days\" after the 1st cycle. Ongoing fatigue, whole body aches & pains along with some nausea.     Reviewed anti-nausea medications & decadron tablets. Patient to call her pharmacy on her way home for a refill of the decadron to start taking it tonight.     Reviewed onpro instructions with patient today.Patient stated that she had horrible body aches so bad that even her teeth hurt. Encouraged her to start taking claritin today and daily for 7 days to help alleviate some of the pain. Patient verbalized understanding and appreciative of this information.       Intravenous Access:  Peripheral IV placed.    Treatment Conditions:  Lab Results   Component Value Date    HGB 15.3 03/22/2023    WBC 8.3 03/22/2023    ANEUTAUTO 5.5 03/22/2023     03/22/2023      Lab Results   Component Value Date     03/22/2023    POTASSIUM 3.8 03/22/2023    CR 0.84 03/22/2023    IBETH 9.3 03/22/2023    BILITOTAL 0.8 03/22/2023    ALBUMIN 4.0 03/22/2023    ALT 70 (H) 03/22/2023    AST 23 03/22/2023     Results reviewed, labs MET treatment parameters, ok to proceed with treatment.    Post Infusion Assessment:  Patient tolerated infusion without incident.  Blood return noted pre and post infusion.  Site patent and intact, free from redness, edema or discomfort.  No evidence of extravasations.  Access discontinued per protocol.     ONPRO  Was placed on patient's: right side of abdomen.    Was placed at 1:55 PM    Podpal used: Yes    ONPRO injector device Lot number: [id number]    Patient education included: what patient can expect after application, what colored lights mean on the device, when to remove device, when and where to call with questions or issues and all patients questions " answered.    Patient tolerated administration well.      Discharge Plan:   Discharge instructions reviewed with: Patient.  Patient and/or family verbalized understanding of discharge instructions and all questions answered.  Patient discharged in stable condition accompanied by: self.  Departure Mode: Ambulatory.      Lorelei Lopez RN

## 2023-03-22 NOTE — LETTER
3/22/2023         RE: Ginger Hernandez  [address] [facility] [address]        Dear Colleague,    Thank you for referring your patient, Ginger Hernandez, to the [facility]. Please see a copy of my visit note below.    Oncology Follow Up Visit: March 22, 2023    Oncologist: Dr Fartun Jackson  PCP: No Ref-Primary, Physician    Diagnosis: Stage 2A Right Breast Cancer (T2M0N0)  Ginger Hernandez is a 67 yo female who had a screening mammogram on 11/18/2022 which noted a Right sided mass 2.4 cm  12/2/22 US with 2.3 cm mass and upper limit of normal LN  12/12/22 bx with IDC, ER+SC+ Her2 2+ FISH negative  1/17/23 RIGHT lumpectomy G3 IDC, 3.3 cm 0/1 SLN, ER90%+ SC 20%+ [id number] negative via FISH  Oncotype recurrence score 36, high risk  Treatment:   1/17/2022 Right lumpectomy with SLN biopsy  3/2/2023 beganTaxotere/Cytoxan    Interval History: Ms Hernandez comes to clinic for review prior to continuation of TC to treat her breast cancer with cycle 2. Pt reports she continues with some weakness and fatigue.She feels she is eating well with no nausea keeping her from eating. Bowels noted to be variable. No skin changes but mentions she has had a mouth sore with first cycle. Denies new pains but walking is not getting easier. Has neuropathy From her diabetes and denies finger neuropathy for now.   Rest of comprehensive and complete ROS is reviewed and is negative.   Past Medical History:   Diagnosis Date     Breast cancer (H) 12/12/2022    Right Breast     Current Outpatient Medications   Medication     celecoxib (CELEBREX) 200 MG capsule     Cholecalciferol 250 MCG (57021 UT) CAPS     clobetasol (TEMOVATE) 0.05 % external ointment     cycloSPORINE (RESTASIS) 0.05 % ophthalmic emulsion     desoximetasone (TOPICORT) 0.25 % external cream     eletriptan (RELPAX) 40 MG tablet     esomeprazole (NEXIUM) 20 MG DR capsule     fexofenadine (ALLEGRA) 180 MG tablet     fluticasone (FLONASE)  "50 MCG/ACT nasal spray     ibuprofen (ADVIL/MOTRIN) 200 MG tablet     ketoconazole (NIZORAL) 2 % external shampoo     lisinopril-hydrochlorothiazide (ZESTORETIC) 10-12.5 MG tablet     magnesium 200 MG TABS     metroNIDAZOLE (METROGEL) 1 % external gel     Misc Natural Products (LUTEIN 20 PO)     omeprazole (PRILOSEC) 20 MG DR capsule     ondansetron (ZOFRAN) 8 MG tablet     penciclovir (DENAVIR) 1 % external cream     prochlorperazine (COMPAZINE) 10 MG tablet     rizatriptan (MAXALT) 10 MG tablet     SUMAtriptan (IMITREX) 20 MG/ACT nasal spray     valACYclovir (VALTREX) 1000 mg tablet     zolpidem (AMBIEN) 10 MG tablet     dexamethasone (DECADRON) 4 MG tablet     No current facility-administered medications for this visit.     Facility-Administered Medications Ordered in Other Visits   Medication     heparin 100 UNIT/ML injection 500 Units     sodium chloride (PF) 0.9% PF flush 10 mL     Allergies   Allergen Reactions     Diagnostic X-Ray Materials Anaphylaxis and Hives     Other reaction(s): Unknown  HUT Comment: xray dye; HUT Reaction: Anaphylaxis; HUT Reaction: Hives; HUT Severity: High; HUT Noted: 20110301  xray dye  xray dye       Ketamine Other (See Comments)     Other reaction(s): Emotional Disturbance, Unknown  \"complete, emotional reaction\"; HUT Comment: \"complete, emotional reaction\"\"complete, emotional reaction\"; HUT Reaction: Emotional Disturbance; HUT Noted: 20180525  \"complete, emotional reaction\"  \"complete, emotional reaction\"  \"complete, emotional reaction\"  Other reaction(s): Emotional Disturbance  \"complete, emotional reaction\"  \"complete, emotional reaction\"       Nabumetone      Other reaction(s): Gastrointestinal, GI Upset, Unknown  gerd; HUT Comment: gerd; HUT Reaction: Gastrointestinal; HUT Noted: 20130123  gerd  Other reaction(s): Gastrointestinal, GI Upset  gerd  gerd       Pregabalin      Other reaction(s): confusion, Confusion, Confusion, Mental Status Change  HUT Reaction: Confusion; HUT " "Reaction: Confusion; HUT Noted: 20120315  Other reaction(s): Confusion, Mental Status Change         Physical Exam:BP (!) 150/80 (BP Location: Right arm, Patient Position: Chair, Cuff Size: Adult Large)   Pulse 107   Ht 1.621 m (5' 3.8\")   Wt 94.3 kg (208 lb)   SpO2 96%   BMI 35.93 kg/m     ECOG PS- 1  Constitutional: Alert and in no distress. Cooperative. Obese.  ENT: Eyes bright, No mouth sores at present. Wears hearing aids.   Neck: Supple, No adenopathy.  Cardiac: Heart rate and rhythm is regular and strong without murmur  Respiratory: Breathing easy. Lung sounds clear to auscultation  Breasts:right breast without new mass or tenderness.   GI: Abdomen is rounded, soft, non-tender, BS normal. No masses or organomegaly  MS: Muscle tone fair- using cane to get around but has stilted walking.  extremities normal with no edema.   Skin: No suspicious lesions or rashes  Neuro: Sensory grossly WNL, gait stilted..   Lymph: Normal ant/post cervical, axillary, supraclavicular nodes  Psych: Mentation appears normal and affect normal/bright with good conversation.     Laboratory/Imaging Results:   Results for orders placed or performed in visit on 03/22/23   Comprehensive metabolic panel     Status: Abnormal   Result Value Ref Range    Sodium 133 133 - 144 mmol/L    Potassium 3.8 3.4 - 5.3 mmol/L    Chloride 103 94 - 109 mmol/L    Carbon Dioxide (CO2) 24 20 - 32 mmol/L    Anion Gap 6 3 - 14 mmol/L    Urea Nitrogen 20 7 - 30 mg/dL    Creatinine 0.84 0.52 - 1.04 mg/dL    Calcium 9.3 8.5 - 10.1 mg/dL    Glucose 231 (H) 70 - 99 mg/dL    Alkaline Phosphatase 66 40 - 150 U/L    AST 23 0 - 45 U/L    ALT 70 (H) 0 - 50 U/L    Protein Total 7.4 6.8 - 8.8 g/dL    Albumin 4.0 3.4 - 5.0 g/dL    Bilirubin Total 0.8 0.2 - 1.3 mg/dL    GFR Estimate 75 >60 mL/min/1.73m2   CBC with platelets and differential     Status: Abnormal   Result Value Ref Range    WBC Count 8.3 4.0 - 11.0 10e3/uL    RBC Count 5.32 (H) 3.80 - 5.20 10e6/uL    " Hemoglobin 15.3 11.7 - 15.7 g/dL    Hematocrit 46.1 35.0 - 47.0 %    MCV 87 78 - 100 fL    MCH 28.8 26.5 - 33.0 pg    MCHC 33.2 31.5 - 36.5 g/dL    RDW 13.5 10.0 - 15.0 %    Platelet Count 309 150 - 450 10e3/uL    % Neutrophils 67 %    % Lymphocytes 23 %    % Monocytes 9 %    % Eosinophils 0 %    % Basophils 1 %    % Immature Granulocytes 0 %    NRBCs per 100 WBC 0 <1 /100    Absolute Neutrophils 5.5 1.6 - 8.3 10e3/uL    Absolute Lymphocytes 1.9 0.8 - 5.3 10e3/uL    Absolute Monocytes 0.7 0.0 - 1.3 10e3/uL    Absolute Eosinophils 0.0 0.0 - 0.7 10e3/uL    Absolute Basophils 0.1 0.0 - 0.2 10e3/uL    Absolute Immature Granulocytes 0.0 <=0.4 10e3/uL    Absolute NRBCs 0.0 10e3/uL   CBC with platelets differential     Status: Abnormal    Narrative    The following orders were created for panel order CBC with platelets differential.  Procedure                               Abnormality         Status                     ---------                               -----------         ------                     CBC with platelets and d...[[id number]]  Abnormal            Final result                 Please view results for these tests on the individual orders.     Assessment and Plan:   Stage 2a Right Breast Cancer- Pt began treatment with TC on 3/2. She seems to have tolerated first cycle well with minimal side effects. Her blood sugars have elevated and she is noting fatigue from plan but has recuperated well and is acceptable for treatment.   We will see her prior to each treatment.   Will have radiation consult after TCx 4 completed.  Elevated glucose- Pt does have not have diagnosis of diabetes but is aware that her blood sugars do range high. Suggest we will need to watch closely and may need to talk with PCP if continues with elevation.   The total time of this encounter amounted to  40 minutes. This time included face toface time spent with the patient, prep work, ordering tests, and performing post visit  documentation.  Carissa Padilla Cnp        Again, thank you for allowing me to participate in the care of your patient.        Sincerely,        Carissa Padilla, NP, APRN CNP

## 2023-04-01 ENCOUNTER — HEALTH MAINTENANCE LETTER (OUTPATIENT)
Age: 68
End: 2023-04-01

## 2023-04-11 ENCOUNTER — INFUSION THERAPY VISIT (OUTPATIENT)
Dept: INFUSION THERAPY | Facility: CLINIC | Age: 68
End: 2023-04-11
Payer: COMMERCIAL

## 2023-04-11 ENCOUNTER — RX ONLY (RX ONLY)
Age: 68
End: 2023-04-11

## 2023-04-11 ENCOUNTER — LAB (OUTPATIENT)
Dept: LAB | Facility: CLINIC | Age: 68
End: 2023-04-11
Payer: COMMERCIAL

## 2023-04-11 ENCOUNTER — ONCOLOGY VISIT (OUTPATIENT)
Dept: ONCOLOGY | Facility: CLINIC | Age: 68
End: 2023-04-11
Attending: INTERNAL MEDICINE
Payer: COMMERCIAL

## 2023-04-11 VITALS
SYSTOLIC BLOOD PRESSURE: 149 MMHG | WEIGHT: 208 LBS | DIASTOLIC BLOOD PRESSURE: 91 MMHG | HEART RATE: 95 BPM | TEMPERATURE: 98.3 F | BODY MASS INDEX: 35.93 KG/M2 | RESPIRATION RATE: 20 BRPM | OXYGEN SATURATION: 96 %

## 2023-04-11 DIAGNOSIS — Z17.0 MALIGNANT NEOPLASM OF AREOLA OF RIGHT BREAST IN FEMALE, ESTROGEN RECEPTOR POSITIVE (H): Primary | ICD-10-CM

## 2023-04-11 DIAGNOSIS — C50.211 MALIGNANT NEOPLASM OF UPPER-INNER QUADRANT OF RIGHT FEMALE BREAST, UNSPECIFIED ESTROGEN RECEPTOR STATUS (H): Primary | ICD-10-CM

## 2023-04-11 DIAGNOSIS — C50.011 MALIGNANT NEOPLASM OF AREOLA OF RIGHT BREAST IN FEMALE, ESTROGEN RECEPTOR POSITIVE (H): Primary | ICD-10-CM

## 2023-04-11 DIAGNOSIS — C50.011 MALIGNANT NEOPLASM OF AREOLA OF RIGHT BREAST IN FEMALE, ESTROGEN RECEPTOR POSITIVE (H): ICD-10-CM

## 2023-04-11 DIAGNOSIS — Z17.0 MALIGNANT NEOPLASM OF AREOLA OF RIGHT BREAST IN FEMALE, ESTROGEN RECEPTOR POSITIVE (H): ICD-10-CM

## 2023-04-11 LAB
ALBUMIN SERPL-MCNC: 4.2 G/DL (ref 3.4–5)
ALP SERPL-CCNC: 57 U/L (ref 40–150)
ALT SERPL W P-5'-P-CCNC: 56 U/L (ref 0–50)
ANION GAP SERPL CALCULATED.3IONS-SCNC: 5 MMOL/L (ref 3–14)
AST SERPL W P-5'-P-CCNC: 24 U/L (ref 0–45)
BASOPHILS # BLD AUTO: 0.1 10E3/UL (ref 0–0.2)
BASOPHILS NFR BLD AUTO: 1 %
BILIRUB SERPL-MCNC: 0.4 MG/DL (ref 0.2–1.3)
BUN SERPL-MCNC: 15 MG/DL (ref 7–30)
CALCIUM SERPL-MCNC: 9.2 MG/DL (ref 8.5–10.1)
CHLORIDE BLD-SCNC: 105 MMOL/L (ref 94–109)
CO2 SERPL-SCNC: 25 MMOL/L (ref 20–32)
CREAT SERPL-MCNC: 0.88 MG/DL (ref 0.52–1.04)
EOSINOPHIL # BLD AUTO: 0 10E3/UL (ref 0–0.7)
EOSINOPHIL NFR BLD AUTO: 0 %
ERYTHROCYTE [DISTWIDTH] IN BLOOD BY AUTOMATED COUNT: 14.6 % (ref 10–15)
GFR SERPL CREATININE-BSD FRML MDRD: 71 ML/MIN/1.73M2
GLUCOSE BLD-MCNC: 110 MG/DL (ref 70–99)
HCT VFR BLD AUTO: 43.4 % (ref 35–47)
HGB BLD-MCNC: 14.6 G/DL (ref 11.7–15.7)
IMM GRANULOCYTES # BLD: 0 10E3/UL
IMM GRANULOCYTES NFR BLD: 0 %
LYMPHOCYTES # BLD AUTO: 3 10E3/UL (ref 0.8–5.3)
LYMPHOCYTES NFR BLD AUTO: 26 %
MCH RBC QN AUTO: 29.2 PG (ref 26.5–33)
MCHC RBC AUTO-ENTMCNC: 33.6 G/DL (ref 31.5–36.5)
MCV RBC AUTO: 87 FL (ref 78–100)
MONOCYTES # BLD AUTO: 1.4 10E3/UL (ref 0–1.3)
MONOCYTES NFR BLD AUTO: 12 %
NEUTROPHILS # BLD AUTO: 7.1 10E3/UL (ref 1.6–8.3)
NEUTROPHILS NFR BLD AUTO: 61 %
NRBC # BLD AUTO: 0 10E3/UL
NRBC BLD AUTO-RTO: 0 /100
PLATELET # BLD AUTO: 299 10E3/UL (ref 150–450)
POTASSIUM BLD-SCNC: 4.2 MMOL/L (ref 3.4–5.3)
PROT SERPL-MCNC: 7.3 G/DL (ref 6.8–8.8)
RBC # BLD AUTO: 5 10E6/UL (ref 3.8–5.2)
SODIUM SERPL-SCNC: 135 MMOL/L (ref 133–144)
WBC # BLD AUTO: 11.5 10E3/UL (ref 4–11)

## 2023-04-11 PROCEDURE — 96367 TX/PROPH/DG ADDL SEQ IV INF: CPT | Performed by: INTERNAL MEDICINE

## 2023-04-11 PROCEDURE — 99207 PR NO CHARGE LOS: CPT

## 2023-04-11 PROCEDURE — 96413 CHEMO IV INFUSION 1 HR: CPT | Performed by: INTERNAL MEDICINE

## 2023-04-11 PROCEDURE — 96375 TX/PRO/DX INJ NEW DRUG ADDON: CPT | Performed by: INTERNAL MEDICINE

## 2023-04-11 PROCEDURE — 36415 COLL VENOUS BLD VENIPUNCTURE: CPT | Performed by: INTERNAL MEDICINE

## 2023-04-11 PROCEDURE — 80053 COMPREHEN METABOLIC PANEL: CPT | Performed by: INTERNAL MEDICINE

## 2023-04-11 PROCEDURE — 99213 OFFICE O/P EST LOW 20 MIN: CPT | Mod: 25 | Performed by: INTERNAL MEDICINE

## 2023-04-11 PROCEDURE — 96377 APPLICATON ON-BODY INJECTOR: CPT | Mod: 59 | Performed by: INTERNAL MEDICINE

## 2023-04-11 PROCEDURE — 85025 COMPLETE CBC W/AUTO DIFF WBC: CPT | Performed by: INTERNAL MEDICINE

## 2023-04-11 PROCEDURE — 96417 CHEMO IV INFUS EACH ADDL SEQ: CPT | Performed by: INTERNAL MEDICINE

## 2023-04-11 RX ORDER — ONDANSETRON 2 MG/ML
8 INJECTION INTRAMUSCULAR; INTRAVENOUS ONCE
Status: COMPLETED | OUTPATIENT
Start: 2023-04-11 | End: 2023-04-11

## 2023-04-11 RX ORDER — EPINEPHRINE 1 MG/ML
0.3 INJECTION, SOLUTION INTRAMUSCULAR; SUBCUTANEOUS EVERY 5 MIN PRN
Status: CANCELLED | OUTPATIENT
Start: 2023-04-13

## 2023-04-11 RX ORDER — MEPERIDINE HYDROCHLORIDE 25 MG/ML
25 INJECTION INTRAMUSCULAR; INTRAVENOUS; SUBCUTANEOUS EVERY 30 MIN PRN
Status: CANCELLED | OUTPATIENT
Start: 2023-04-13

## 2023-04-11 RX ORDER — FLUOCINONIDE 0.5 MG/ML
SOLUTION TOPICAL
Qty: 60 | Refills: 1 | Status: ERX | COMMUNITY
Start: 2023-04-11

## 2023-04-11 RX ORDER — HEPARIN SODIUM (PORCINE) LOCK FLUSH IV SOLN 100 UNIT/ML 100 UNIT/ML
5 SOLUTION INTRAVENOUS
Status: CANCELLED | OUTPATIENT
Start: 2023-04-13

## 2023-04-11 RX ORDER — ALBUTEROL SULFATE 90 UG/1
1-2 AEROSOL, METERED RESPIRATORY (INHALATION)
Status: CANCELLED
Start: 2023-04-13

## 2023-04-11 RX ORDER — DIPHENHYDRAMINE HYDROCHLORIDE 50 MG/ML
50 INJECTION INTRAMUSCULAR; INTRAVENOUS
Status: CANCELLED
Start: 2023-04-13

## 2023-04-11 RX ORDER — ONDANSETRON 2 MG/ML
8 INJECTION INTRAMUSCULAR; INTRAVENOUS ONCE
Status: CANCELLED | OUTPATIENT
Start: 2023-04-13

## 2023-04-11 RX ORDER — HEPARIN SODIUM,PORCINE 10 UNIT/ML
5 VIAL (ML) INTRAVENOUS
Status: CANCELLED | OUTPATIENT
Start: 2023-04-13

## 2023-04-11 RX ORDER — ALBUTEROL SULFATE 0.83 MG/ML
2.5 SOLUTION RESPIRATORY (INHALATION)
Status: CANCELLED | OUTPATIENT
Start: 2023-04-13

## 2023-04-11 RX ORDER — METHYLPREDNISOLONE SODIUM SUCCINATE 125 MG/2ML
125 INJECTION, POWDER, LYOPHILIZED, FOR SOLUTION INTRAMUSCULAR; INTRAVENOUS
Status: CANCELLED
Start: 2023-04-13

## 2023-04-11 RX ADMIN — Medication 250 ML: at 14:22

## 2023-04-11 RX ADMIN — ONDANSETRON 8 MG: 2 INJECTION INTRAMUSCULAR; INTRAVENOUS at 14:23

## 2023-04-11 NOTE — NURSING NOTE
"Oncology Rooming Note    April 11, 2023 1:34 PM   Ginger Hernandez is a 68 year old female who presents for:    Chief Complaint   Patient presents with     Oncology Clinic Visit     Follow up prior to TX     Initial Vitals: BP (!) 149/91 (BP Location: Right arm, Patient Position: Sitting, Cuff Size: Adult Large)   Pulse 95   Temp 98.3  F (36.8  C) (Oral)   Resp 20   Wt 94.3 kg (208 lb)   SpO2 96%   BMI 35.93 kg/m   Estimated body mass index is 35.93 kg/m  as calculated from the following:    Height as of 3/22/23: 1.621 m (5' 3.8\").    Weight as of this encounter: 94.3 kg (208 lb). Body surface area is 2.06 meters squared.  Data Unavailable Comment: Data Unavailable   No LMP recorded.  Allergies reviewed: Yes  Medications reviewed: Yes    Medications: Medication refills not needed today.  Pharmacy name entered into [employer]: [facility] - [place] [id number]    Clinical concerns: No major changes reported.        Kylie Ga LPN April 11, 2023 1:35 PM            "

## 2023-04-11 NOTE — PROGRESS NOTES
Dr. Jackson has ordered radiation oncology for patient. Patient received Cycle 3 of Cytoxan and Taxotere today. She has one remaining infusion scheduled for 5/4/23.     2 weeks post final chemo - 5/18. Writer received referral, reviewed for appropriate plan, and call transferred to New Patient Scheduling for completion.

## 2023-04-11 NOTE — LETTER
4/11/2023         RE: Ginger Hernandez  [address] [facility] [address]        Dear Colleague,    Thank you for referring your patient, Ginger Hernandez, to the [facility]. Please see a copy of my visit note below.    [facility]  MEDICAL ONCOLOGY    RETURN PATIENT VISIT NOTE    Reason for visit: Breast cancer    Oncology Treatment Summary  1. 11/18/22 DEXA normal  2. 11/18/22 screening mammogram with Right sided mass 2.4 cm  3. 12/2/22 US with 2.3 cm mass and upper limit of normal LN  4. 12/12/22 bx with IDC, ER+NE+ Her2 2+ FISH negative  5. 1/17/23 RIGHT lumpectomy G3 IDC, 3.3 cm 0/1 SLN, ER90%+ NE 20%+ [id number] negative via FISH  6. Oncotype recurrence score 36, high risk     HISTORY OF PRESENTING ILLNESS  Pleasant 67 year old seen today for cycle 3 of taxotere cytoxan. She is doing okay. She lost her hair as expected. The arthalgias from her neulasta have been bothersome but better with claritan. She otherwise has had no n/v/d/c. Not much in the way of fatigue.      PAST MEDICAL HISTORY  Hepatitis C, migraines, HTN, GERD, lumbar fusion, arthritis.      CURRENT OUTPATIENT MEDICATIONS  Current Outpatient Medications   Medication     celecoxib (CELEBREX) 200 MG capsule     Cholecalciferol 250 MCG (48927 UT) CAPS     clobetasol (TEMOVATE) 0.05 % external ointment     cycloSPORINE (RESTASIS) 0.05 % ophthalmic emulsion     desoximetasone (TOPICORT) 0.25 % external cream     dexamethasone (DECADRON) 4 MG tablet     eletriptan (RELPAX) 40 MG tablet     esomeprazole (NEXIUM) 20 MG DR capsule     fexofenadine (ALLEGRA) 180 MG tablet     fluticasone (FLONASE) 50 MCG/ACT nasal spray     ibuprofen (ADVIL/MOTRIN) 200 MG tablet     ketoconazole (NIZORAL) 2 % external shampoo     lisinopril-hydrochlorothiazide (ZESTORETIC) 10-12.5 MG tablet     magnesium 200 MG TABS     metroNIDAZOLE (METROGEL) 1 % external gel     Misc Natural Products (LUTEIN 20 PO)      "omeprazole (PRILOSEC) 20 MG DR capsule     ondansetron (ZOFRAN) 8 MG tablet     penciclovir (DENAVIR) 1 % external cream     prochlorperazine (COMPAZINE) 10 MG tablet     rizatriptan (MAXALT) 10 MG tablet     SUMAtriptan (IMITREX) 20 MG/ACT nasal spray     valACYclovir (VALTREX) 1000 mg tablet     zolpidem (AMBIEN) 10 MG tablet     No current facility-administered medications for this visit.        ALLERGIES     Allergies   Allergen Reactions     Diagnostic X-Ray Materials Anaphylaxis and Hives     Other reaction(s): Unknown  HUT Comment: xray dye; HUT Reaction: Anaphylaxis; HUT Reaction: Hives; HUT Severity: High; HUT Noted: 20110301  xray dye  xray dye       Ketamine Other (See Comments)     Other reaction(s): Emotional Disturbance, Unknown  \"complete, emotional reaction\"; HUT Comment: \"complete, emotional reaction\"\"complete, emotional reaction\"; HUT Reaction: Emotional Disturbance; HUT Noted: 20180525  \"complete, emotional reaction\"  \"complete, emotional reaction\"  \"complete, emotional reaction\"  Other reaction(s): Emotional Disturbance  \"complete, emotional reaction\"  \"complete, emotional reaction\"       Nabumetone      Other reaction(s): Gastrointestinal, GI Upset, Unknown  gerd; HUT Comment: gerd; HUT Reaction: Gastrointestinal; HUT Noted: 20130123  gerd  Other reaction(s): Gastrointestinal, GI Upset  gerd  gerd       Pregabalin      Other reaction(s): confusion, Confusion, Confusion, Mental Status Change  HUT Reaction: Confusion; HUT Reaction: Confusion; HUT Noted: 20120315  Other reaction(s): Confusion, Mental Status Change          SOCIAL HISTORY  Lives in [place], single, retired, no tobacco, rare etoh, no children.      FAMILY HISTORY  Mother with breast cancer in late 60s, MGM breast cancer in 60s, she was told by a 2nd cousin that they have a brca mutation and was given a piece of paper about it some years ago but has not followed up on it.     REVIEW OF SYSTEMS  Review Of Systems  Skin: " negative  Eyes: negative  Ears/Nose/Throat: negative  Respiratory: No shortness of breath, dyspnea on exertion, cough, or hemoptysis  Cardiovascular: negative  Gastrointestinal: negative  Genitourinary: negative  Musculoskeletal: negative  Neurologic: negative  Psychiatric: negative  Hematologic/Lymphatic/Immunologic: negative  Endocrine: negative      PHYSICAL EXAM  B/P: Data Unavailable, T: Data Unavailable, P: Data Unavailable, R: Data Unavailable  Wt Readings from Last 3 Encounters:   03/22/23 94.3 kg (208 lb)   03/02/23 95 kg (209 lb 6.4 oz)   02/24/23 96.6 kg (213 lb)       ECOG PPS0    Physical Exam  Vitals reviewed.   Constitutional:       Appearance: Normal appearance.   HENT:      Head: Normocephalic and atraumatic.   Eyes:      Extraocular Movements: Extraocular movements intact.      Conjunctiva/sclera: Conjunctivae normal.      Pupils: Pupils are equal, round, and reactive to light.   Neurological:      General: No focal deficit present.      Mental Status: She is alert and oriented to person, place, and time. Mental status is at baseline.   Psychiatric:         Behavior: Behavior normal.        Recent Labs   Lab Test 04/11/23  1315 03/22/23  1043 03/02/23  1223 02/24/23  1634    133 137 139   POTASSIUM 4.2 3.8 4.0 4.1   CHLORIDE 105 103 106 110*   CO2 25 24 25 26   ANIONGAP 5 6 6 3   BUN 15 20 15 15   CR 0.88 0.84 0.81 0.76   * 231* 188* 148*   IBETH 9.2 9.3 9.6 9.3     No results for input(s): MAG, PHOS in the last 43233 hours.  Recent Labs   Lab Test 04/11/23  1315 03/22/23  1043 03/02/23  1223 02/24/23  1634   WBC 11.5* 8.3 10.1 9.4   HGB 14.6 15.3 16.6* 15.7    309 282 254   MCV 87 87 87 87   NEUTROPHIL 61 67 50 52     Recent Labs   Lab Test 04/11/23  1315 03/22/23  1043 03/02/23  1223   BILITOTAL 0.4 0.8 0.5   ALKPHOS 57 66 61   ALT 56* 70* 62*   AST 24 23 28   ALBUMIN 4.2 4.0 4.2     No results found for: TSH  No results for input(s): CEA in the last 56477 hours.  Results for  orders placed or performed in visit on 01/17/23   [facility]    Narrative    Images were obtained from an external facility.  Click PACS Images   hyperlink to view images.  Textual results have been scanned into the   media tab.        ASSESSMENT  AND RECOMMENDATIONS:    1. T2N0M0 ER+[place]+Her2 negative IDC s/p RIGHT lumpectomy, high risk oncotype score 36.   2. Family hx of breast cancer with vague question of a brca mutation in family.      Plan    1.still declines genetic testing  2. Cycle 3 TC today  3. RTC in 3 weeks for C4  4. Discussed adjuvant radiation after chemotherapy is complete and I will place a rad onc consult  5. See me at the end of radiation to discussed antiestrogen therapy and I would likely plan for arimidex given her normal bone density.    Fartun Jackson MD on 4/11/2023 at 2:25 PM            Again, thank you for allowing me to participate in the care of your patient.        Sincerely,        Fartun Jackson MD

## 2023-04-11 NOTE — PROGRESS NOTES
"Infusion Nursing Note:  Ginger Hernandez presents today for C3D1 Taxotere, Cytoxan and Onpro application.    Patient seen by provider today: Yes: Dr. Jackson   present during visit today: Not Applicable.    Note: Patient reports last time she felt bone pain and all over aches after infusion, she is taking claritin to help with this, took it today. Also had some nausea but has not needed to take PRNs. States that with previous onpro, she had some issues where it was \"stuck\" when she tried to take it off, has 3 puncture marks in this area on the RL abdomen that are taking longer to heal per her report. She did discuss this with Dr. Jackson, will avoid this area today with onpro. Educated to keep waistband of pants off healing wounds, previous injection sites.     Intravenous Access:  Peripheral IV placed.    Treatment Conditions:  Lab Results   Component Value Date    HGB 14.6 04/11/2023    WBC 11.5 (H) 04/11/2023    ANEUTAUTO 7.1 04/11/2023     04/11/2023      Lab Results   Component Value Date     04/11/2023    POTASSIUM 4.2 04/11/2023    CR 0.88 04/11/2023    IBETH 9.2 04/11/2023    BILITOTAL 0.4 04/11/2023    ALBUMIN 4.2 04/11/2023    ALT 56 (H) 04/11/2023    AST 24 04/11/2023     Results reviewed, labs MET treatment parameters, ok to proceed with treatment.    Post Infusion Assessment:  Patient tolerated infusion without incident.  Blood return noted pre and post infusion.  Site patent and intact, free from redness, edema or discomfort.  No evidence of extravasations.  Access discontinued per protocol.     ONPRO  Was placed on patient's: left side of abdomen.    Was placed at 4:00 PM    Podpal used: Yes    ONPRO injector device Lot number: [id number]    Patient education included: what patient can expect after application, what colored lights mean on the device, when to remove device, when and where to call with questions or issues, all patients questions answered and that Neulasta administration " "will occur at 7:00 PM on 4/12. Pt declined booklet, educated to take off at 8:00 PM when light is solid green and indicator says empty.     Patient will return 5/2 for next appointment.     End of shift report given to Charissa CUEVA for completion of care.     Amanda Downing RN        Completion of care report:  Around 1608, patient turned on the call light and reported feeling \"off.\" Cytoxan stopped.  She reported feeling nauseated and had a headache.  Cheeks noted to be pink, but Rina Ferrari RN noted that there was no change from when she first started.  Cool wash cloths applied to neck and forehead.  Symptoms started improving with stopping the chemo.  Observed patient for about 10 minutes, symptoms much improved.  Restarted Cytoxan and ran the remainder of the drug over 45 minutes.  Patient tolerated the remainder of the infusion without incident.  Discharged in stable condition.  Recommend running her next dose over 45 minutes.     Charissa Pritchett RN-BSN, PHN, OCN  [facility]                      "

## 2023-04-11 NOTE — PROGRESS NOTES
[facility] PHYSICIANS  MEDICAL ONCOLOGY    RETURN PATIENT VISIT NOTE    Reason for visit: Breast cancer    Oncology Treatment Summary  1. 11/18/22 DEXA normal  2. 11/18/22 screening mammogram with Right sided mass 2.4 cm  3. 12/2/22 US with 2.3 cm mass and upper limit of normal LN  4. 12/12/22 bx with IDC, ER+KS+ Her2 2+ FISH negative  5. 1/17/23 RIGHT lumpectomy G3 IDC, 3.3 cm 0/1 SLN, ER90%+ KS 20%+ Tbw7vor negative via FISH  6. Oncotype recurrence score 36, high risk     HISTORY OF PRESENTING ILLNESS  Pleasant 67 year old seen today for cycle 3 of taxotere cytoxan. She is doing okay. She lost her hair as expected. The arthalgias from her neulasta have been bothersome but better with claritan. She otherwise has had no n/v/d/c. Not much in the way of fatigue.      PAST MEDICAL HISTORY  Hepatitis C, migraines, HTN, GERD, lumbar fusion, arthritis.      CURRENT OUTPATIENT MEDICATIONS  Current Outpatient Medications   Medication     celecoxib (CELEBREX) 200 MG capsule     Cholecalciferol 250 MCG (97847 UT) CAPS     clobetasol (TEMOVATE) 0.05 % external ointment     cycloSPORINE (RESTASIS) 0.05 % ophthalmic emulsion     desoximetasone (TOPICORT) 0.25 % external cream     dexamethasone (DECADRON) 4 MG tablet     eletriptan (RELPAX) 40 MG tablet     esomeprazole (NEXIUM) 20 MG DR capsule     fexofenadine (ALLEGRA) 180 MG tablet     fluticasone (FLONASE) 50 MCG/ACT nasal spray     ibuprofen (ADVIL/MOTRIN) 200 MG tablet     ketoconazole (NIZORAL) 2 % external shampoo     lisinopril-hydrochlorothiazide (ZESTORETIC) 10-12.5 MG tablet     magnesium 200 MG TABS     metroNIDAZOLE (METROGEL) 1 % external gel     Misc Natural Products (LUTEIN 20 PO)     omeprazole (PRILOSEC) 20 MG DR capsule     ondansetron (ZOFRAN) 8 MG tablet     penciclovir (DENAVIR) 1 % external cream     prochlorperazine (COMPAZINE) 10 MG tablet     rizatriptan (MAXALT) 10 MG tablet     SUMAtriptan (IMITREX) 20 MG/ACT nasal spray     valACYclovir  "(VALTREX) 1000 mg tablet     zolpidem (AMBIEN) 10 MG tablet     No current facility-administered medications for this visit.        ALLERGIES     Allergies   Allergen Reactions     Diagnostic X-Ray Materials Anaphylaxis and Hives     Other reaction(s): Unknown  HUT Comment: xray dye; HUT Reaction: Anaphylaxis; HUT Reaction: Hives; HUT Severity: High; HUT Noted: 20110301  xray dye  xray dye       Ketamine Other (See Comments)     Other reaction(s): Emotional Disturbance, Unknown  \"complete, emotional reaction\"; HUT Comment: \"complete, emotional reaction\"\"complete, emotional reaction\"; HUT Reaction: Emotional Disturbance; HUT Noted: 20180525  \"complete, emotional reaction\"  \"complete, emotional reaction\"  \"complete, emotional reaction\"  Other reaction(s): Emotional Disturbance  \"complete, emotional reaction\"  \"complete, emotional reaction\"       Nabumetone      Other reaction(s): Gastrointestinal, GI Upset, Unknown  gerd; HUT Comment: gerd; HUT Reaction: Gastrointestinal; HUT Noted: 20130123  gerd  Other reaction(s): Gastrointestinal, GI Upset  gerd  gerd       Pregabalin      Other reaction(s): confusion, Confusion, Confusion, Mental Status Change  HUT Reaction: Confusion; HUT Reaction: Confusion; HUT Noted: 20120315  Other reaction(s): Confusion, Mental Status Change          SOCIAL HISTORY  Lives in [place], single, retired, no tobacco, rare etoh, no children.      FAMILY HISTORY  Mother with breast cancer in late 60s, MGM breast cancer in 60s, she was told by a 2nd cousin that they have a brca mutation and was given a piece of paper about it some years ago but has not followed up on it.     REVIEW OF SYSTEMS  Review Of Systems  Skin: negative  Eyes: negative  Ears/Nose/Throat: negative  Respiratory: No shortness of breath, dyspnea on exertion, cough, or hemoptysis  Cardiovascular: negative  Gastrointestinal: negative  Genitourinary: negative  Musculoskeletal: negative  Neurologic: negative  Psychiatric: " negative  Hematologic/Lymphatic/Immunologic: negative  Endocrine: negative      PHYSICAL EXAM  B/P: Data Unavailable, T: Data Unavailable, P: Data Unavailable, R: Data Unavailable  Wt Readings from Last 3 Encounters:   03/22/23 94.3 kg (208 lb)   03/02/23 95 kg (209 lb 6.4 oz)   02/24/23 96.6 kg (213 lb)       ECOG PPS0    Physical Exam  Vitals reviewed.   Constitutional:       Appearance: Normal appearance.   HENT:      Head: Normocephalic and atraumatic.   Eyes:      Extraocular Movements: Extraocular movements intact.      Conjunctiva/sclera: Conjunctivae normal.      Pupils: Pupils are equal, round, and reactive to light.   Neurological:      General: No focal deficit present.      Mental Status: She is alert and oriented to person, place, and time. Mental status is at baseline.   Psychiatric:         Behavior: Behavior normal.        Recent Labs   Lab Test 04/11/23  1315 03/22/23  1043 03/02/23  1223 02/24/23  1634    133 137 139   POTASSIUM 4.2 3.8 4.0 4.1   CHLORIDE 105 103 106 110*   CO2 25 24 25 26   ANIONGAP 5 6 6 3   BUN 15 20 15 15   CR 0.88 0.84 0.81 0.76   * 231* 188* 148*   IBETH 9.2 9.3 9.6 9.3     No results for input(s): MAG, PHOS in the last 33898 hours.  Recent Labs   Lab Test 04/11/23  1315 03/22/23  1043 03/02/23  1223 02/24/23  1634   WBC 11.5* 8.3 10.1 9.4   HGB 14.6 15.3 16.6* 15.7    309 282 254   MCV 87 87 87 87   NEUTROPHIL 61 67 50 52     Recent Labs   Lab Test 04/11/23  1315 03/22/23  1043 03/02/23  1223   BILITOTAL 0.4 0.8 0.5   ALKPHOS 57 66 61   ALT 56* 70* 62*   AST 24 23 28   ALBUMIN 4.2 4.0 4.2     No results found for: TSH  No results for input(s): CEA in the last 01074 hours.  Results for orders placed or performed in visit on 01/17/23   [facility]    Narrative    Images were obtained from an external facility.  Click PACS Images   hyperlink to view images.  Textual results have been scanned into the   media tab.        ASSESSMENT  AND  RECOMMENDATIONS:    1. T2N0M0 ER+IN+Her2 negative IDC s/p RIGHT lumpectomy, high risk oncotype score 36.   2. Family hx of breast cancer with vague question of a brca mutation in family.      Plan    1.still declines genetic testing  2. Cycle 3 TC today  3. RTC in 3 weeks for C4  4. Discussed adjuvant radiation after chemotherapy is complete and I will place a rad onc consult  5. See me at the end of radiation to discussed antiestrogen therapy and I would likely plan for arimidex given her normal bone density.    Fartun Jackson MD on 4/11/2023 at 2:25 PM

## 2023-04-13 ENCOUNTER — NURSE TRIAGE (OUTPATIENT)
Dept: NURSING | Facility: CLINIC | Age: 68
End: 2023-04-13
Payer: COMMERCIAL

## 2023-04-13 ENCOUNTER — TELEPHONE (OUTPATIENT)
Dept: ONCOLOGY | Facility: CLINIC | Age: 68
End: 2023-04-13
Payer: COMMERCIAL

## 2023-04-13 NOTE — TELEPHONE ENCOUNTER
Received voicemail from patient stating the more chemo cycles she gets, the more side effects she gets. States she informed the chemo nurse on 4/11/23 about some shortness of breath she has been having, but would like to discuss her SOB, and side effects, more.    Routing to triage to contact patient please.    Jeni Giles RN on 4/13/2023 at 5:27 PM

## 2023-04-13 NOTE — TELEPHONE ENCOUNTER
Is oncology patient, had chemo on Tuesday, had called nurse due to side effects. Onset shortness of breath after chemo. (Had slowed drip rate down due to strange side effects, dizziness, etc). SOB developed sometime that day, oncology staff noted sob and patient said she thought it was just normal but now feels it is worse. Since then patient realizes worse than other treatments. Is having Moderate SOB at rest and increased with talking. No cardiac history, on BP meds, on chemo. NO lung disease. BP is : 129/83 HR is: 102      Protocol reviewed and care advice, disposition is to ED now. Call back with further questions/concerns.     LISA YEPEZ RN  [facility] nurse advisors  4/13/2023  6:52 PM    Reason for Disposition    [1] MODERATE difficulty breathing (e.g., speaks in phrases, SOB even at rest, pulse 100-120) AND [2] NEW-onset or WORSE than normal    Additional Information    Negative: SEVERE difficulty breathing (e.g., struggling for each breath, speaks in single words)    Negative: [1] Breathing stopped AND [2] hasn't returned    Negative: Choking on something    Negative: Bluish (or gray) lips or face now    Negative: Difficult to awaken or acting confused (e.g., disoriented, slurred speech)    Negative: Passed out (i.e., lost consciousness, collapsed and was not responding)    Negative: Wheezing started suddenly after medicine, an allergic food or bee sting    Negative: Stridor    Negative: Slow, shallow and weak breathing    Negative: Sounds like a life-threatening emergency to the triager    Protocols used: BREATHING DIFFICULTY-A-AH

## 2023-04-13 NOTE — TELEPHONE ENCOUNTER
Patient called back at 629pm, Patient was triaged by this writer and disposition is to go to ED now. Will go to [place] ED. See [facility]NA triage note also.

## 2023-04-14 ENCOUNTER — TELEPHONE (OUTPATIENT)
Dept: ONCOLOGY | Facility: CLINIC | Age: 68
End: 2023-04-14
Payer: COMMERCIAL

## 2023-04-14 ENCOUNTER — PATIENT OUTREACH (OUTPATIENT)
Dept: ONCOLOGY | Facility: CLINIC | Age: 68
End: 2023-04-14
Payer: COMMERCIAL

## 2023-04-14 NOTE — PROGRESS NOTES
[facility]: Cancer Care Short Note                                    Discussion with Patient:                                                      Call placed to patient to follow up on shortness of breath she was experiencing yesterday. Patient didn't go to ED yesterday as directed by Triage. The SOB is better today. No SOB at rest.       Assessment:                                                      Assessment completed with:: Patient    Constitutional  Fatigue: Fatigue not relieved by rest OR limiting instrumental ADL  Fever: Absent or within normal limits    Respiratory  Cough: Absent or within normal limits  Dyspnea: Shortness of breath with moderate exertion    Pain  Patient Reported Pain?: Yes, but is new or different pain  Pain Score: Moderate Pain (5)  Pain Loc: Other - see comment (joints/arthritic pain)  (DVPRS) Pain Rating Score : Interrupts some activities (Usual arthritic pain is worse.)    Patient Coping  Accepting    Clinic Utilization  Patient Aware of Next Appointment?: Yes    Other Patient Concerns  Other Patient Reported Concerns: Yes, see notes (Neulasta contributing to pain)        Intervention/Education provided during outreach:                                                         Patient to follow up as scheduled at next appt  Patient to call/[employer] App[employer] message with updates  Confirmed patient has clinic and triage numbers    Signature:  Jennifer Cole RN

## 2023-04-14 NOTE — TELEPHONE ENCOUNTER
Pt called on 4/13 and LVM at 5:43pm on the scheduling line. She stated she is very out of breathe this time around and would like it documented. I will send a message to Dr Jackson/Jennifer/Keanu to have someone call her.     Fabiola

## 2023-04-14 NOTE — TELEPHONE ENCOUNTER
Per chart review, patient was triage last evening and advised to proceed to the ED. Will have RN[facility] follow up.    Janneth Camargo RN, BSN, PHN, OCN  .[facility]

## 2023-05-02 ENCOUNTER — LAB (OUTPATIENT)
Dept: LAB | Facility: CLINIC | Age: 68
End: 2023-05-02
Payer: COMMERCIAL

## 2023-05-02 ENCOUNTER — INFUSION THERAPY VISIT (OUTPATIENT)
Dept: INFUSION THERAPY | Facility: CLINIC | Age: 68
End: 2023-05-02
Payer: COMMERCIAL

## 2023-05-02 ENCOUNTER — ONCOLOGY VISIT (OUTPATIENT)
Dept: ONCOLOGY | Facility: CLINIC | Age: 68
End: 2023-05-02
Attending: INTERNAL MEDICINE
Payer: COMMERCIAL

## 2023-05-02 VITALS
DIASTOLIC BLOOD PRESSURE: 83 MMHG | HEIGHT: 64 IN | HEART RATE: 91 BPM | OXYGEN SATURATION: 97 % | WEIGHT: 212 LBS | TEMPERATURE: 98.6 F | SYSTOLIC BLOOD PRESSURE: 143 MMHG | BODY MASS INDEX: 36.19 KG/M2 | RESPIRATION RATE: 18 BRPM

## 2023-05-02 DIAGNOSIS — C50.011 MALIGNANT NEOPLASM OF AREOLA OF RIGHT BREAST IN FEMALE, ESTROGEN RECEPTOR POSITIVE (H): Primary | ICD-10-CM

## 2023-05-02 DIAGNOSIS — Z17.0 MALIGNANT NEOPLASM OF AREOLA OF RIGHT BREAST IN FEMALE, ESTROGEN RECEPTOR POSITIVE (H): Primary | ICD-10-CM

## 2023-05-02 DIAGNOSIS — Z80.3 FAMILY HISTORY OF MALIGNANT NEOPLASM OF BREAST: ICD-10-CM

## 2023-05-02 DIAGNOSIS — M25.50 MULTIPLE JOINT PAIN: ICD-10-CM

## 2023-05-02 LAB
ALBUMIN SERPL-MCNC: 3.9 G/DL (ref 3.4–5)
ALP SERPL-CCNC: 60 U/L (ref 40–150)
ALT SERPL W P-5'-P-CCNC: 44 U/L (ref 0–50)
ANION GAP SERPL CALCULATED.3IONS-SCNC: 5 MMOL/L (ref 3–14)
AST SERPL W P-5'-P-CCNC: 19 U/L (ref 0–45)
BASOPHILS # BLD AUTO: 0.1 10E3/UL (ref 0–0.2)
BASOPHILS NFR BLD AUTO: 1 %
BILIRUB SERPL-MCNC: 0.3 MG/DL (ref 0.2–1.3)
BUN SERPL-MCNC: 17 MG/DL (ref 7–30)
CALCIUM SERPL-MCNC: 9.2 MG/DL (ref 8.5–10.1)
CHLORIDE BLD-SCNC: 105 MMOL/L (ref 94–109)
CO2 SERPL-SCNC: 29 MMOL/L (ref 20–32)
CREAT SERPL-MCNC: 0.97 MG/DL (ref 0.52–1.04)
EOSINOPHIL # BLD AUTO: 0 10E3/UL (ref 0–0.7)
EOSINOPHIL NFR BLD AUTO: 0 %
ERYTHROCYTE [DISTWIDTH] IN BLOOD BY AUTOMATED COUNT: 16.4 % (ref 10–15)
GFR SERPL CREATININE-BSD FRML MDRD: 63 ML/MIN/1.73M2
GLUCOSE BLD-MCNC: 115 MG/DL (ref 70–99)
HCT VFR BLD AUTO: 38.9 % (ref 35–47)
HGB BLD-MCNC: 12.9 G/DL (ref 11.7–15.7)
HOLD SPECIMEN: NORMAL
IMM GRANULOCYTES # BLD: 0 10E3/UL
IMM GRANULOCYTES NFR BLD: 0 %
LYMPHOCYTES # BLD AUTO: 2.3 10E3/UL (ref 0.8–5.3)
LYMPHOCYTES NFR BLD AUTO: 26 %
MCH RBC QN AUTO: 29.6 PG (ref 26.5–33)
MCHC RBC AUTO-ENTMCNC: 33.2 G/DL (ref 31.5–36.5)
MCV RBC AUTO: 89 FL (ref 78–100)
MONOCYTES # BLD AUTO: 1.1 10E3/UL (ref 0–1.3)
MONOCYTES NFR BLD AUTO: 12 %
NEUTROPHILS # BLD AUTO: 5.3 10E3/UL (ref 1.6–8.3)
NEUTROPHILS NFR BLD AUTO: 61 %
NRBC # BLD AUTO: 0 10E3/UL
NRBC BLD AUTO-RTO: 0 /100
PLATELET # BLD AUTO: 235 10E3/UL (ref 150–450)
POTASSIUM BLD-SCNC: 3.8 MMOL/L (ref 3.4–5.3)
PROT SERPL-MCNC: 6.8 G/DL (ref 6.8–8.8)
RBC # BLD AUTO: 4.36 10E6/UL (ref 3.8–5.2)
SODIUM SERPL-SCNC: 139 MMOL/L (ref 133–144)
WBC # BLD AUTO: 8.8 10E3/UL (ref 4–11)

## 2023-05-02 PROCEDURE — 96417 CHEMO IV INFUS EACH ADDL SEQ: CPT | Performed by: NURSE PRACTITIONER

## 2023-05-02 PROCEDURE — 96367 TX/PROPH/DG ADDL SEQ IV INF: CPT | Performed by: NURSE PRACTITIONER

## 2023-05-02 PROCEDURE — 99207 PR NO CHARGE LOS: CPT

## 2023-05-02 PROCEDURE — 99215 OFFICE O/P EST HI 40 MIN: CPT | Mod: 25 | Performed by: NURSE PRACTITIONER

## 2023-05-02 PROCEDURE — 80053 COMPREHEN METABOLIC PANEL: CPT | Performed by: INTERNAL MEDICINE

## 2023-05-02 PROCEDURE — 96375 TX/PRO/DX INJ NEW DRUG ADDON: CPT | Performed by: NURSE PRACTITIONER

## 2023-05-02 PROCEDURE — 36415 COLL VENOUS BLD VENIPUNCTURE: CPT | Performed by: INTERNAL MEDICINE

## 2023-05-02 PROCEDURE — 85025 COMPLETE CBC W/AUTO DIFF WBC: CPT | Performed by: INTERNAL MEDICINE

## 2023-05-02 PROCEDURE — 96413 CHEMO IV INFUSION 1 HR: CPT | Performed by: NURSE PRACTITIONER

## 2023-05-02 RX ORDER — MEPERIDINE HYDROCHLORIDE 25 MG/ML
25 INJECTION INTRAMUSCULAR; INTRAVENOUS; SUBCUTANEOUS EVERY 30 MIN PRN
Status: CANCELLED | OUTPATIENT
Start: 2023-05-04

## 2023-05-02 RX ORDER — ONDANSETRON 2 MG/ML
8 INJECTION INTRAMUSCULAR; INTRAVENOUS ONCE
Status: COMPLETED | OUTPATIENT
Start: 2023-05-02 | End: 2023-05-02

## 2023-05-02 RX ORDER — HEPARIN SODIUM (PORCINE) LOCK FLUSH IV SOLN 100 UNIT/ML 100 UNIT/ML
5 SOLUTION INTRAVENOUS
Status: CANCELLED | OUTPATIENT
Start: 2023-05-04

## 2023-05-02 RX ORDER — METHYLPREDNISOLONE SODIUM SUCCINATE 125 MG/2ML
125 INJECTION, POWDER, LYOPHILIZED, FOR SOLUTION INTRAMUSCULAR; INTRAVENOUS
Status: CANCELLED
Start: 2023-05-04

## 2023-05-02 RX ORDER — ALBUTEROL SULFATE 90 UG/1
1-2 AEROSOL, METERED RESPIRATORY (INHALATION)
Status: CANCELLED
Start: 2023-05-04

## 2023-05-02 RX ORDER — ONDANSETRON 2 MG/ML
8 INJECTION INTRAMUSCULAR; INTRAVENOUS ONCE
Status: CANCELLED | OUTPATIENT
Start: 2023-05-04

## 2023-05-02 RX ORDER — ALBUTEROL SULFATE 0.83 MG/ML
2.5 SOLUTION RESPIRATORY (INHALATION)
Status: CANCELLED | OUTPATIENT
Start: 2023-05-04

## 2023-05-02 RX ORDER — DIPHENHYDRAMINE HYDROCHLORIDE 50 MG/ML
50 INJECTION INTRAMUSCULAR; INTRAVENOUS
Status: CANCELLED
Start: 2023-05-04

## 2023-05-02 RX ORDER — EPINEPHRINE 1 MG/ML
0.3 INJECTION, SOLUTION INTRAMUSCULAR; SUBCUTANEOUS EVERY 5 MIN PRN
Status: CANCELLED | OUTPATIENT
Start: 2023-05-04

## 2023-05-02 RX ORDER — HEPARIN SODIUM,PORCINE 10 UNIT/ML
5 VIAL (ML) INTRAVENOUS
Status: CANCELLED | OUTPATIENT
Start: 2023-05-04

## 2023-05-02 RX ADMIN — Medication 250 ML: at 12:09

## 2023-05-02 RX ADMIN — ONDANSETRON 8 MG: 2 INJECTION INTRAMUSCULAR; INTRAVENOUS at 12:11

## 2023-05-02 NOTE — LETTER
5/2/2023         RE: Ginger Hernandez  [address] [facility] [address]        Dear Colleague,    Thank you for referring your patient, Ginger Hernandez, to the [facility]. Please see a copy of my visit note below.    Oncology Follow Up Visit: May 2, 2023     Oncologist: Dr Fartun Jackson  PCP: No Ref-Primary, Physician    Diagnosis: Stage 2A Right Breast Cancer (T2M0N0)  Ginger Hernandez is a 69 yo female who had a screening mammogram on 11/18/2022 which noted a Right sided mass 2.4 cm  12/2/22 US with 2.3 cm mass and upper limit of normal LN  12/12/22 bx with IDC, ER+MN+ Her2 2+ FISH negative  1/17/23 RIGHT lumpectomy G3 IDC, 3.3 cm 0/1 SLN, ER90%+ MN 20%+ Dor5glb negative via FISH  Oncotype recurrence score 36, high risk  Treatment:   1/17/2022 Right lumpectomy with SLN biopsy  3/2/2023 beganTaxotere/Cytoxan    Interval History: Ms Hernandez comes to clinic for review prior to continuation of TC to treat her breast cancer with cycle 4. Many physical pains with right knee and left hip- difficulty walking. Mild nausea with last cycle. Mild constipation. Is taking foods but appetite is better at end of cycle. Some palpitations just after treatment with all cycles - may last for 4-5 days.   Shares she had some SOB with infusion and for a couple days after with last 2 infusions. Using colestipol for help with loose stools and is improved.   Rest of comprehensive and complete ROS is reviewed and is negative.   Past Medical History:   Diagnosis Date     Breast cancer (H) 12/12/2022    Right Breast     Current Outpatient Medications   Medication     celecoxib (CELEBREX) 200 MG capsule     Cholecalciferol 250 MCG (27603 UT) CAPS     clobetasol (TEMOVATE) 0.05 % external ointment     cycloSPORINE (RESTASIS) 0.05 % ophthalmic emulsion     desoximetasone (TOPICORT) 0.25 % external cream     eletriptan (RELPAX) 40 MG tablet     esomeprazole (NEXIUM) 20 MG DR capsule      "fexofenadine (ALLEGRA) 180 MG tablet     fluticasone (FLONASE) 50 MCG/ACT nasal spray     ibuprofen (ADVIL/MOTRIN) 200 MG tablet     ketoconazole (NIZORAL) 2 % external shampoo     lisinopril-hydrochlorothiazide (ZESTORETIC) 10-12.5 MG tablet     magnesium 200 MG TABS     metroNIDAZOLE (METROGEL) 1 % external gel     Misc Natural Products (LUTEIN 20 PO)     omeprazole (PRILOSEC) 20 MG DR capsule     ondansetron (ZOFRAN) 8 MG tablet     penciclovir (DENAVIR) 1 % external cream     prochlorperazine (COMPAZINE) 10 MG tablet     rizatriptan (MAXALT) 10 MG tablet     SUMAtriptan (IMITREX) 20 MG/ACT nasal spray     valACYclovir (VALTREX) 1000 mg tablet     zolpidem (AMBIEN) 10 MG tablet     dexamethasone (DECADRON) 4 MG tablet     No current facility-administered medications for this visit.     Allergies   Allergen Reactions     Iodinated Contrast Media Anaphylaxis and Hives     Other reaction(s): Unknown  HUT Comment: xray dye; HUT Reaction: Anaphylaxis; HUT Reaction: Hives; HUT Severity: High; HUT Noted: 20110301  xray dye  xray dye       Ketamine Other (See Comments)     Other reaction(s): Emotional Disturbance, Unknown  \"complete, emotional reaction\"; HUT Comment: \"complete, emotional reaction\"\"complete, emotional reaction\"; HUT Reaction: Emotional Disturbance; HUT Noted: 20180525  \"complete, emotional reaction\"  \"complete, emotional reaction\"  \"complete, emotional reaction\"  Other reaction(s): Emotional Disturbance  \"complete, emotional reaction\"  \"complete, emotional reaction\"       Nabumetone      Other reaction(s): Gastrointestinal, GI Upset, Unknown  gerd; HUT Comment: gerd; HUT Reaction: Gastrointestinal; HUT Noted: 20130123  gerd  Other reaction(s): Gastrointestinal, GI Upset  gerd  gerd       Pregabalin      Other reaction(s): confusion, Confusion, Confusion, Mental Status Change  HUT Reaction: Confusion; HUT Reaction: Confusion; HUT Noted: 20120315  Other reaction(s): Confusion, Mental Status Change     - " "single,    Physical Exam:BP (!) 143/83 (BP Location: Right arm)   Pulse 91   Temp 98.6  F (37  C) (Oral)   Resp 18   Ht 1.621 m (5' 3.82\")   Wt 96.2 kg (212 lb)   SpO2 97%   BMI 36.60 kg/m     ECOG PS- 1  Constitutional: Alert and in no distress. Cooperative. Obese.  ENT: Eyes bright, No mouth sores at present. Wears hearing aids.   Neck: Supple, No adenopathy.  Cardiac: Heart rate and rhythm is regular and strong without murmur  Respiratory: Breathing easy. Lung sounds clear to auscultation  Breasts:right breast without new mass or tenderness at previous incision sites.   GI: Abdomen is rounded with obvious hernia to lower central abdomen, soft, non-tender, BS normal. No masses or organomegaly  MS: Muscle tone fair- using cane to get around but has stilted walking.  extremities normal with no edema.   Skin: No suspicious lesions or rashes  Neuro: Sensory grossly WNL, gait stilted.  Lymph: Normal ant/post cervical, axillary, supraclavicular nodes  Psych: Mentation appears normal and affect normal/bright with good conversation- very talkative.     Laboratory/Imaging Results:   Results for orders placed or performed in visit on 05/02/23   Comprehensive metabolic panel     Status: Abnormal   Result Value Ref Range    Sodium 139 133 - 144 mmol/L    Potassium 3.8 3.4 - 5.3 mmol/L    Chloride 105 94 - 109 mmol/L    Carbon Dioxide (CO2) 29 20 - 32 mmol/L    Anion Gap 5 3 - 14 mmol/L    Urea Nitrogen 17 7 - 30 mg/dL    Creatinine 0.97 0.52 - 1.04 mg/dL    Calcium 9.2 8.5 - 10.1 mg/dL    Glucose 115 (H) 70 - 99 mg/dL    Alkaline Phosphatase 60 40 - 150 U/L    AST 19 0 - 45 U/L    ALT 44 0 - 50 U/L    Protein Total 6.8 6.8 - 8.8 g/dL    Albumin 3.9 3.4 - 5.0 g/dL    Bilirubin Total 0.3 0.2 - 1.3 mg/dL    GFR Estimate 63 >60 mL/min/1.73m2   Extra Tube     Status: None (In process)    Narrative    The following orders were created for panel order Extra Tube.  Procedure                               Abnormality         " Status                     ---------                               -----------         ------                     Extra Serum Separator Tu...[[id number]]                      In process                   Please view results for these tests on the individual orders.   CBC with platelets and differential     Status: Abnormal   Result Value Ref Range    WBC Count 8.8 4.0 - 11.0 10e3/uL    RBC Count 4.36 3.80 - 5.20 10e6/uL    Hemoglobin 12.9 11.7 - 15.7 g/dL    Hematocrit 38.9 35.0 - 47.0 %    MCV 89 78 - 100 fL    MCH 29.6 26.5 - 33.0 pg    MCHC 33.2 31.5 - 36.5 g/dL    RDW 16.4 (H) 10.0 - 15.0 %    Platelet Count 235 150 - 450 10e3/uL    % Neutrophils 61 %    % Lymphocytes 26 %    % Monocytes 12 %    % Eosinophils 0 %    % Basophils 1 %    % Immature Granulocytes 0 %    NRBCs per 100 WBC 0 <1 /100    Absolute Neutrophils 5.3 1.6 - 8.3 10e3/uL    Absolute Lymphocytes 2.3 0.8 - 5.3 10e3/uL    Absolute Monocytes 1.1 0.0 - 1.3 10e3/uL    Absolute Eosinophils 0.0 0.0 - 0.7 10e3/uL    Absolute Basophils 0.1 0.0 - 0.2 10e3/uL    Absolute Immature Granulocytes 0.0 <=0.4 10e3/uL    Absolute NRBCs 0.0 10e3/uL   CBC with platelets differential     Status: Abnormal    Narrative    The following orders were created for panel order CBC with platelets differential.  Procedure                               Abnormality         Status                     ---------                               -----------         ------                     CBC with platelets and d...[[id number]]  Abnormal            Final result                 Please view results for these tests on the individual orders.       Assessment and Plan:   Stage 2a Right Breast Cancer- Pt began treatment with TC on 3/2/2023 and is now due for 4th and final infusion. Full review of use of steroids and support medications today.   She is meeting goals and no changes will be made to plan today with final TC infusion.    She is aware she has radiation consult up with Dr Hendrickson for  5/22/2023   Will see Dr Jackson for review and start of aromatase inhibitor after radiation therapy.   Bone health- DEXA on 11/18/2022 showed normal bone density. Encouraged calcium supplement but states she is a good eater and uses cheese- does have Vitamin D supplement daily. Not currently able to exercise due to physical limitations with hip and knee pains.   Generalized musculoskeletal pains- states she uses Tylenol quite regularly and has Percocet to use infrequently. Also takes celebrex daily for the arthritis pains.   The total time of this encounter amounted to 40 minutes. This time included face toface time spent with the patient, prep work, ordering tests, and performing post visit documentation.  Carissa Padilla Cnp        Again, thank you for allowing me to participate in the care of your patient.        Sincerely,        Carissa Padilla, CASEY, APRN CNP

## 2023-05-02 NOTE — PROGRESS NOTES
"Infusion Nursing Note:  Ginger Hernandez presents today for C4D1 Taxotere and Cytoxan. Declined Neulasta Onpro.   Patient seen by provider today: Yes: Carissa Padilla NP   present during visit today: Not Applicable.    Note:   Patient states she is taking Dexamethasone as prescribed.    Cytoxan given over 45 minutes due to unpleasant feeling with 30 minute administration on previous dose.    Patient declined Neulasta Onpro.  States her WBC are on the high end of normal, this is her last chemo, she is not going to be around other people in the next 10 days and she does not like the side effects of Neulasta.    Intravenous Access:  Peripheral IV placed.    Treatment Conditions:  Lab Results   Component Value Date    HGB 12.9 05/02/2023    WBC 8.8 05/02/2023    ANEUTAUTO 5.3 05/02/2023     05/02/2023      Results reviewed, labs MET treatment parameters, ok to proceed with treatment.      Post Infusion Assessment:  Patient tolerated infusion without incident.  Blood return noted pre and post infusion.  Site patent and intact, free from redness, edema or discomfort.  No evidence of extravasations.  Access discontinued per protocol.       Discharge Plan:   Patient discharged in stable condition accompanied by: self.  Departure Mode: Ambulatory with cane.  Patient rang the \"last chemo\" bell on her way out today.      Jeni Giles RN  "

## 2023-05-02 NOTE — NURSING NOTE
"Oncology Rooming Note    May 2, 2023 10:43 AM   Ginger Hernandez is a 68 year old female who presents for:    Chief Complaint   Patient presents with     Oncology Clinic Visit     Prior to treatment       Initial Vitals: BP (!) 143/83 (BP Location: Right arm)   Pulse 91   Temp 98.6  F (37  C) (Oral)   Resp 18   Ht 1.621 m (5' 3.82\")   Wt 96.2 kg (212 lb)   SpO2 97%   BMI 36.60 kg/m   Estimated body mass index is 36.6 kg/m  as calculated from the following:    Height as of this encounter: 1.621 m (5' 3.82\").    Weight as of this encounter: 96.2 kg (212 lb). Body surface area is 2.08 meters squared.  Data Unavailable Comment: chronic OA pain   No LMP recorded. Patient is postmenopausal.  Allergies reviewed: Yes  Medications reviewed: Yes    Medications: Medication refills not needed today.  Pharmacy name entered into Selleration: [facility] - [facility] [address]    Clinical concerns: noticed SOB and heart palpitations following last treatment.         Kristen Waldrop LPN            "

## 2023-05-02 NOTE — PROGRESS NOTES
Oncology Follow Up Visit: May 2, 2023     Oncologist: Dr Fartun Jackson  PCP: No Ref-Primary, Physician    Diagnosis: Stage 2A Right Breast Cancer (T2M0N0)  Ginger Hernandez is a 69 yo female who had a screening mammogram on 11/18/2022 which noted a Right sided mass 2.4 cm  12/2/22 US with 2.3 cm mass and upper limit of normal LN  12/12/22 bx with IDC, ER+NM+ Her2 2+ FISH negative  1/17/23 RIGHT lumpectomy G3 IDC, 3.3 cm 0/1 SLN, ER90%+ NM 20%+ [id number] negative via FISH  Oncotype recurrence score 36, high risk  Treatment:   1/17/2022 Right lumpectomy with SLN biopsy  3/2/2023 beganTaxotere/Cytoxan    Interval History: Ms Hernandez comes to clinic for review prior to continuation of TC to treat her breast cancer with cycle 4. Many physical pains with right knee and left hip- difficulty walking. Mild nausea with last cycle. Mild constipation. Is taking foods but appetite is better at end of cycle. Some palpitations just after treatment with all cycles - may last for 4-5 days.   Shares she had some SOB with infusion and for a couple days after with last 2 infusions. Using colestipol for help with loose stools and is improved.   Rest of comprehensive and complete ROS is reviewed and is negative.   Past Medical History:   Diagnosis Date     Breast cancer (H) 12/12/2022    Right Breast     Current Outpatient Medications   Medication     celecoxib (CELEBREX) 200 MG capsule     Cholecalciferol 250 MCG (52070 UT) CAPS     clobetasol (TEMOVATE) 0.05 % external ointment     cycloSPORINE (RESTASIS) 0.05 % ophthalmic emulsion     desoximetasone (TOPICORT) 0.25 % external cream     eletriptan (RELPAX) 40 MG tablet     esomeprazole (NEXIUM) 20 MG DR capsule     fexofenadine (ALLEGRA) 180 MG tablet     fluticasone (FLONASE) 50 MCG/ACT nasal spray     ibuprofen (ADVIL/MOTRIN) 200 MG tablet     ketoconazole (NIZORAL) 2 % external shampoo     lisinopril-hydrochlorothiazide (ZESTORETIC) 10-12.5 MG tablet     magnesium 200 MG TABS      "metroNIDAZOLE (METROGEL) 1 % external gel     Misc Natural Products (LUTEIN 20 PO)     omeprazole (PRILOSEC) 20 MG DR capsule     ondansetron (ZOFRAN) 8 MG tablet     penciclovir (DENAVIR) 1 % external cream     prochlorperazine (COMPAZINE) 10 MG tablet     rizatriptan (MAXALT) 10 MG tablet     SUMAtriptan (IMITREX) 20 MG/ACT nasal spray     valACYclovir (VALTREX) 1000 mg tablet     zolpidem (AMBIEN) 10 MG tablet     dexamethasone (DECADRON) 4 MG tablet     No current facility-administered medications for this visit.     Allergies   Allergen Reactions     Iodinated Contrast Media Anaphylaxis and Hives     Other reaction(s): Unknown  HUT Comment: xray dye; HUT Reaction: Anaphylaxis; HUT Reaction: Hives; HUT Severity: High; HUT Noted: 20110301  xray dye  xray dye       Ketamine Other (See Comments)     Other reaction(s): Emotional Disturbance, Unknown  \"complete, emotional reaction\"; HUT Comment: \"complete, emotional reaction\"\"complete, emotional reaction\"; HUT Reaction: Emotional Disturbance; HUT Noted: 20180525  \"complete, emotional reaction\"  \"complete, emotional reaction\"  \"complete, emotional reaction\"  Other reaction(s): Emotional Disturbance  \"complete, emotional reaction\"  \"complete, emotional reaction\"       Nabumetone      Other reaction(s): Gastrointestinal, GI Upset, Unknown  gerd; HUT Comment: gerd; HUT Reaction: Gastrointestinal; HUT Noted: 20130123  gerd  Other reaction(s): Gastrointestinal, GI Upset  gerd  gerd       Pregabalin      Other reaction(s): confusion, Confusion, Confusion, Mental Status Change  HUT Reaction: Confusion; HUT Reaction: Confusion; HUT Noted: 20120315  Other reaction(s): Confusion, Mental Status Change     - single,    Physical Exam:BP (!) 143/83 (BP Location: Right arm)   Pulse 91   Temp 98.6  F (37  C) (Oral)   Resp 18   Ht 1.621 m (5' 3.82\")   Wt 96.2 kg (212 lb)   SpO2 97%   BMI 36.60 kg/m     ECOG PS- 1  Constitutional: Alert and in no distress. Cooperative. " Obese.  ENT: Eyes bright, No mouth sores at present. Wears hearing aids.   Neck: Supple, No adenopathy.  Cardiac: Heart rate and rhythm is regular and strong without murmur  Respiratory: Breathing easy. Lung sounds clear to auscultation  Breasts:right breast without new mass or tenderness at previous incision sites.   GI: Abdomen is rounded with obvious hernia to lower central abdomen, soft, non-tender, BS normal. No masses or organomegaly  MS: Muscle tone fair- using cane to get around but has stilted walking.  extremities normal with no edema.   Skin: No suspicious lesions or rashes  Neuro: Sensory grossly WNL, gait stilted.  Lymph: Normal ant/post cervical, axillary, supraclavicular nodes  Psych: Mentation appears normal and affect normal/bright with good conversation- very talkative.     Laboratory/Imaging Results:   Results for orders placed or performed in visit on 05/02/23   Comprehensive metabolic panel     Status: Abnormal   Result Value Ref Range    Sodium 139 133 - 144 mmol/L    Potassium 3.8 3.4 - 5.3 mmol/L    Chloride 105 94 - 109 mmol/L    Carbon Dioxide (CO2) 29 20 - 32 mmol/L    Anion Gap 5 3 - 14 mmol/L    Urea Nitrogen 17 7 - 30 mg/dL    Creatinine 0.97 0.52 - 1.04 mg/dL    Calcium 9.2 8.5 - 10.1 mg/dL    Glucose 115 (H) 70 - 99 mg/dL    Alkaline Phosphatase 60 40 - 150 U/L    AST 19 0 - 45 U/L    ALT 44 0 - 50 U/L    Protein Total 6.8 6.8 - 8.8 g/dL    Albumin 3.9 3.4 - 5.0 g/dL    Bilirubin Total 0.3 0.2 - 1.3 mg/dL    GFR Estimate 63 >60 mL/min/1.73m2   Extra Tube     Status: None (In process)    Narrative    The following orders were created for panel order Extra Tube.  Procedure                               Abnormality         Status                     ---------                               -----------         ------                     Extra Serum Separator Tu...[[id number]]                      In process                   Please view results for these tests on the individual orders.    CBC with platelets and differential     Status: Abnormal   Result Value Ref Range    WBC Count 8.8 4.0 - 11.0 10e3/uL    RBC Count 4.36 3.80 - 5.20 10e6/uL    Hemoglobin 12.9 11.7 - 15.7 g/dL    Hematocrit 38.9 35.0 - 47.0 %    MCV 89 78 - 100 fL    MCH 29.6 26.5 - 33.0 pg    MCHC 33.2 31.5 - 36.5 g/dL    RDW 16.4 (H) 10.0 - 15.0 %    Platelet Count 235 150 - 450 10e3/uL    % Neutrophils 61 %    % Lymphocytes 26 %    % Monocytes 12 %    % Eosinophils 0 %    % Basophils 1 %    % Immature Granulocytes 0 %    NRBCs per 100 WBC 0 <1 /100    Absolute Neutrophils 5.3 1.6 - 8.3 10e3/uL    Absolute Lymphocytes 2.3 0.8 - 5.3 10e3/uL    Absolute Monocytes 1.1 0.0 - 1.3 10e3/uL    Absolute Eosinophils 0.0 0.0 - 0.7 10e3/uL    Absolute Basophils 0.1 0.0 - 0.2 10e3/uL    Absolute Immature Granulocytes 0.0 <=0.4 10e3/uL    Absolute NRBCs 0.0 10e3/uL   CBC with platelets differential     Status: Abnormal    Narrative    The following orders were created for panel order CBC with platelets differential.  Procedure                               Abnormality         Status                     ---------                               -----------         ------                     CBC with platelets and d...[[id number]]  Abnormal            Final result                 Please view results for these tests on the individual orders.       Assessment and Plan:   Stage 2a Right Breast Cancer- Pt began treatment with TC on 3/2/2023 and is now due for 4th and final infusion. Full review of use of steroids and support medications today.   She is meeting goals and no changes will be made to plan today with final TC infusion.    She is aware she has radiation consult up with Dr Hendrickson for 5/22/2023   Will see Dr Jackson for review and start of aromatase inhibitor after radiation therapy.   Bone health- DEXA on 11/18/2022 showed normal bone density. Encouraged calcium supplement but states she is a good eater and uses cheese- does have Vitamin D  supplement daily. Not currently able to exercise due to physical limitations with hip and knee pains.   Generalized musculoskeletal pains- states she uses Tylenol quite regularly and has Percocet to use infrequently. Also takes celebrex daily for the arthritis pains.   The total time of this encounter amounted to 40 minutes. This time included face toface time spent with the patient, prep work, ordering tests, and performing post visit documentation.  Carissa Padilla,Cnp

## 2023-05-12 ENCOUNTER — PATIENT OUTREACH (OUTPATIENT)
Dept: ONCOLOGY | Facility: CLINIC | Age: 68
End: 2023-05-12
Payer: COMMERCIAL

## 2023-05-12 DIAGNOSIS — C50.011 MALIGNANT NEOPLASM OF AREOLA OF RIGHT BREAST IN FEMALE, ESTROGEN RECEPTOR POSITIVE (H): Primary | ICD-10-CM

## 2023-05-12 DIAGNOSIS — Z17.0 MALIGNANT NEOPLASM OF AREOLA OF RIGHT BREAST IN FEMALE, ESTROGEN RECEPTOR POSITIVE (H): Primary | ICD-10-CM

## 2023-05-12 NOTE — PROGRESS NOTES
[facility]: Cancer Care                                                                                          Received call from patient regarding mouth sores that come and go. Salivary glands at the base of tongue tender. Patient is using salt/baking soda and Biotene mouth rinses. Patient is also interested in Cancer Rehab.  Discussed with Dr. Jakcson. Magic Mouthwash and physical therapy referral ordered.     Signature:  Jennifer Cole RN

## 2023-05-18 NOTE — CONFIDENTIAL NOTE
MEDICAL RECORDS REQUEST   Radiation Oncology  [address] [place] [address]  PHONE: [phone]  Fax: [phone]        FUTURE VISIT INFORMATION                                                   Ginger Hernandez, SHEYLA: 1955 scheduled for future visit at [facility] Radiation Oncology    APPOINTMENT INFORMATION:    Date: 2023    Provider: Dr Hendrickson     Reason for Visit/Diagnosis: Malignant neoplasm of upper-inner quadrant of right female breast, unspecified estrogen receptor status (H)    REFERRAL INFORMATION:    Referring provider:  Fartun Jackson MD    RECORDS REQUESTED FOR VISIT                                                     Action    Action Taken 2023 10:44AM KEB    I called pt Ginger - unavailable.      SOFT TISSUE & BREAST     CT, MRI, PET/CT AND REPORTS yes- [facility] breast imaging is in PACS   ANY RECENT LABS yes   SURGICAL REPORTS yes   PATHOLOGY REPORTS yes- External Breast biopsy - [facility]   Case: [id number]  A.  Breast, right,lumpectomy: Invasive grade 3 ductal carcinoma   CHEMO & MEDICAL ONCOLOGY NOTES yes   CURRENT MEDICATION LIST yes   *Send all radiation Prior radiation records for both [facility] and [facility] Radiation Oncology patients to [facility] with  ATTN: JOYCE/Arely Dosi/Physics

## 2023-05-22 ENCOUNTER — PRE VISIT (OUTPATIENT)
Dept: RADIATION ONCOLOGY | Facility: CLINIC | Age: 68
End: 2023-05-22

## 2023-05-22 ENCOUNTER — OFFICE VISIT (OUTPATIENT)
Dept: RADIATION ONCOLOGY | Facility: CLINIC | Age: 68
End: 2023-05-22
Attending: INTERNAL MEDICINE
Payer: COMMERCIAL

## 2023-05-22 ENCOUNTER — APPOINTMENT (OUTPATIENT)
Dept: RADIATION ONCOLOGY | Facility: CLINIC | Age: 68
End: 2023-05-22
Payer: COMMERCIAL

## 2023-05-22 DIAGNOSIS — C50.211 MALIGNANT NEOPLASM OF UPPER-INNER QUADRANT OF RIGHT FEMALE BREAST, UNSPECIFIED ESTROGEN RECEPTOR STATUS (H): ICD-10-CM

## 2023-05-22 PROBLEM — M43.16 SPONDYLOLISTHESIS AT L3-L4 LEVEL: Status: ACTIVE | Noted: 2018-02-01

## 2023-05-22 PROCEDURE — 99205 OFFICE O/P NEW HI 60 MIN: CPT | Mod: 25 | Performed by: RADIOLOGY

## 2023-05-22 PROCEDURE — 77332 RADIATION TREATMENT AID(S): CPT | Performed by: RADIOLOGY

## 2023-05-22 PROCEDURE — 77290 THER RAD SIMULAJ FIELD CPLX: CPT | Performed by: RADIOLOGY

## 2023-05-22 PROCEDURE — 77263 THER RADIOLOGY TX PLNG CPLX: CPT | Performed by: RADIOLOGY

## 2023-05-22 RX ORDER — OXYCODONE HYDROCHLORIDE 15 MG/1
15-30 TABLET ORAL EVERY 6 HOURS PRN
COMMUNITY
Start: 2023-03-16

## 2023-05-22 ASSESSMENT — PAIN SCALES - GENERAL: PAINLEVEL: MILD PAIN (2)

## 2023-05-22 NOTE — LETTER
5/22/2023         RE: Ginger Hernandez  [address] [facility] [address]        Dear Colleague,    Thank you for referring your patient, Ginger Hernandez, to the [facility] RADIATION ONCOLOGY [place]. Please see a copy of my visit note below.    Dear Colleagues,  Today Ginger Hernandez was seen in consultation.  IDENTIFICATION: This is a 68 year old woman with right (UIQ) breast cancer, status post lumpectomy and SLNBx, revealing G3 IDCA w/ G3 DCIS, [id number] ER+/AR+/H2N- disease excised with a close noninvasive margin, followed by TC x4 completed on 5/2/23 referred for adjuvant radiation therapy.   HISTORY OF PRESENT ILLNESS: Ginger Hernandez was in her usual state of health this past year.  On November 18, 2022 screening mammogram showed spiculated mass in the right breast 3 o'clock position.  On December 2, 2022 right breast ultrasound showed a 2.3 cm solid mass with normal sized right axillary lymph nodes.  On December 12, 2022 right breast ultrasound-guided biopsy was consistent with grade 3 IDC with no LVSI, ER/AR positive HER2/lavelle equivocal by IHC.  On January 17, 2023 Dr. Nascimento took her to the OR and she had a right radioactive seed guided lumpectomy and sentinel lymph node biopsy.  Pathology revealed single focus of 3.3 cm grade 3 IDC with grade 3 DCIS.  Negative LVSI.  Negative invasive margins and close lateral margin (1mm).  0 out of 1 involved lymph nodes.   This gave her a stage of pT2 N0 M0, ER/AR positive HER2/lavelle negative.  Post placement digital mammographic and ultrasound imaging demonstrates the radioactive pellet rests in the targeted area. Her postoperative course has been unenventful.  She was then seen by Dr. Jackson.  Her Oncotype score returned back as 36 and she received TC x4 from 3/2/23-5/2/23.  She is to receive adjuvant endocrine therapy after XRT.    Since her surgery, she has continued to heal well and denies any significant pain.  She has good ROM.   She denies any other new masses, skin changes, shortness of breath, chest or bony pain, or new neurologic symptoms. She is being seen here today for consideration of postoperative radiotherapy.  REVIEW OF SYSTEMS: As per HPI, a 14-point review of system is otherwise negative.  PAST RADIATION THERAPY:  Denies.  PAST CTD/PACEMAKER: Denies  BREAST RISK FACTORS:  No history of prior breast biopsy. No family history of ovarian cancer.  Maternal grandmother and mother had breast cancer. May have had second cousins that are BRCA+. Declining genetic testing at this time.  She started her menses at age 12.  .  Menopause age 58. No history of HRT. OCP use x3 years.    Past Medical History:   Diagnosis Date     Breast cancer (H) 2022    Right Breast     Past Surgical History:   Procedure Laterality Date     BREAST BIOPSY, RT/LT Right 2022     LUMPECTOMY BREAST WITH SENTINEL NODE, COMBINED Right 2023    Dr. Nascimento     No family history on file.  Social History     Tobacco Use     Smoking status: Never     Smokeless tobacco: Never   Vaping Use     Vaping status: Not on file   Substance Use Topics     Alcohol use: Not Currently     Current Outpatient Medications   Medication     celecoxib (CELEBREX) 200 MG capsule     Cholecalciferol 250 MCG (13701 UT) CAPS     clobetasol (TEMOVATE) 0.05 % external ointment     cycloSPORINE (RESTASIS) 0.05 % ophthalmic emulsion     desoximetasone (TOPICORT) 0.25 % external cream     dexamethasone (DECADRON) 4 MG tablet     eletriptan (RELPAX) 40 MG tablet     esomeprazole (NEXIUM) 20 MG DR capsule     fexofenadine (ALLEGRA) 180 MG tablet     fluticasone (FLONASE) 50 MCG/ACT nasal spray     ibuprofen (ADVIL/MOTRIN) 200 MG tablet     ketoconazole (NIZORAL) 2 % external shampoo     lisinopril-hydrochlorothiazide (ZESTORETIC) 10-12.5 MG tablet     magic mouthwash suspension (diphenhydrAMINE, lidocaine, aluminum-magnesium & simethicone)     magnesium 200 MG TABS      "metroNIDAZOLE (METROGEL) 1 % external gel     Misc Natural Products (LUTEIN 20 PO)     omeprazole (PRILOSEC) 20 MG DR capsule     ondansetron (ZOFRAN) 8 MG tablet     penciclovir (DENAVIR) 1 % external cream     prochlorperazine (COMPAZINE) 10 MG tablet     rizatriptan (MAXALT) 10 MG tablet     SUMAtriptan (IMITREX) 20 MG/ACT nasal spray     valACYclovir (VALTREX) 1000 mg tablet     zolpidem (AMBIEN) 10 MG tablet     No current facility-administered medications for this visit.     Allergies   Allergen Reactions     Iodinated Contrast Media Anaphylaxis and Hives     Other reaction(s): Unknown  HUT Comment: xray dye; HUT Reaction: Anaphylaxis; HUT Reaction: Hives; HUT Severity: High; HUT Noted: 20110301  xray dye  xray dye       Ketamine Other (See Comments)     Other reaction(s): Emotional Disturbance, Unknown  \"complete, emotional reaction\"; HUT Comment: \"complete, emotional reaction\"\"complete, emotional reaction\"; HUT Reaction: Emotional Disturbance; HUT Noted: 20180525  \"complete, emotional reaction\"  \"complete, emotional reaction\"  \"complete, emotional reaction\"  Other reaction(s): Emotional Disturbance  \"complete, emotional reaction\"  \"complete, emotional reaction\"       Nabumetone      Other reaction(s): Gastrointestinal, GI Upset, Unknown  gerd; HUT Comment: gerd; HUT Reaction: Gastrointestinal; HUT Noted: 20130123  gerd  Other reaction(s): Gastrointestinal, GI Upset  gerd  gerd       Pregabalin      Other reaction(s): confusion, Confusion, Confusion, Mental Status Change  HUT Reaction: Confusion; HUT Reaction: Confusion; HUT Noted: 20120315  Other reaction(s): Confusion, Mental Status Change       PHYSICAL EXAMINATION:  BP (P) 130/87   Pulse (P) 87   Temp (P) 98.2  F (36.8  C) (Oral)   Resp (P) 18   Wt (P) 96.2 kg (212 lb)   SpO2 (P) 97%   BMI (P) 36.60 kg/m    GENERAL Well-appearing woman in no acute distress.  HEENT Normocephalic, atraumatic.  Sclerae anicteric.  CVR  Regular rate and rhythm.  No " murmurs, rubs, or gallops.  LUNGS Clear to auscultation bilaterally.  BREASTS Breasts are examined in the supine and upright position.  The breasts are symmetric.  Theleft breast is unremarkable, as there is no erythema, ulceration or suspicious nodularity within it.  Within the right breast and axilla there are two well healed incisions.  Firmness of these incisions is not suspicious.  There is no suspicious erythema, ulceration or nodularity within the right breast.  There is no erythema, retraction, desquamation or discharge appreciated within the right or left nipple areolar complex.    LYMPH No supraclavicular, infraclavicular, or axillary lymphadenopathy appreciated bilaterally.  ABDOMEN Soft.    EXT  No clubbing or cyanosis or edema.    NEURO No focal deficits.  MSK  Good ROM.   SKIN  Warm and well perfused.    PSYCH  Alert and oriented x 3    ECOG PERFORMANCE STATUS: 0    IMPRESSION/PLAN: Ginger Hernandez is a 68 year old woman with right (UIQ) breast cancer, status post lumpectomy and SLNBx, revealing G3 IDCA w/ G3 DCIS, [id number] ER+/IA+/H2N- disease excised with a close noninvasive margin, followed by TC x4 completed on 5/2/23 referred for adjuvant radiation therapy.   I recommend adjuvant XRT to improve local control and overall survival.  Plan is to treat the affected breast based on multiple randomized prospective data which show decrease risk of local recurrence by ~50% with the addition of XRT to BCS and the EBCTCG metaanalysis published in Lancet 2011 which shows that for every 4 recurrences avoided by year 10 one breast cancer death is avoided by year 15.  Given her HG disease and close DCIS margin she will also receive a tumor cavity boost based EORTC and Abdul Boost Trials.  The risks, benefits, treatment rationale and regimen of radiation therapy to the breast were discussed in great detail today with the patient.  Risks include but are not limited to skin erythema, desquamation, hyperpigmentation,  breast and lymphedema, fibrosis, telengectasia, pneumonitis, cardiac toxicity, rib fracture and secondary malignancy. The patient consented to therapy and will have a CT simulation completed today. Treatment will start within 1 week.    Additional problem list to be addressed in the following manner:  1. Systemic/hormonal treatment : s/p systemic chemo that was recommended.  Will f/u with Med Onc/Dr. Jackson 1-2 weeks after XRT completed to discuss adjuvant endocrine therapy.   There was ample time for questions and all were answered to the patient's satisfaction. Thank you for allowing me to participate in the care of this pleasant patient. If you have any questions, please do not hesitate to contact my office.    Sincerely,  Vernell Hendrickson MD            Again, thank you for allowing me to participate in the care of your patient.        Sincerely,        GAIL Hendrickson MD

## 2023-05-22 NOTE — NURSING NOTE
REASON FOR APPOINTMENT   Type of Cancer: R breast IDC ER/NJ+ HER2-  Location: R breast  Date of Symptom Onset: 11/18/2022 Bilateral digital screening mammogram    TREATMENT TO-DATE FOR THIS CANCER  Surgery ? R breast seed localized lumpectomy with R SLN bx Dr. Nascimento   Chemotherapy ? 4 cycles of Taxotere + cytoxan from 3/2/23-5/2/23   Other Treatments for this Cancer ? no    PERSONAL HISTORY OF CANCER   Previous Cancer ? no   Prior Radiation ? no   Prior Chemotherapy ? no   Prior Hormonal Therapy ? no     REFERRALS NEEDED  no    VITALS  BP (P) 130/87   Pulse (P) 87   Temp (P) 98.2  F (36.8  C) (Oral)   Resp (P) 18   Wt (P) 96.2 kg (212 lb)   SpO2 (P) 97%   BMI (P) 36.60 kg/m      PACEMAKER/IMPLANTED CARDIAC DEVICE no    PAIN  Denies pain related to visit, patient reports chronic low back pain that radiates to top of bilateral thighs rating as 2/10.    PSYCHOSOCIAL  Marital Status: single  Patient lives in home with alone.  Number of children: 0  Working status: Retired  Do you feel safe in your home? Yes    REVIEW OF SYSTEMS  Skin: positive for rosacea to face and dry skin to scalp  Eyes: positive for cataracts, glasses, and macular degeneration  Ears/Nose/Throat: negative  Respiratory: Dyspnea on exertion  Cardiovascular: negative  Gastrointestinal: positive for constipation  Genitourinary: positive for urgency  Musculoskeletal: positive for back pain and joint pain  Neurologic: positive for headaches, numbness or tingling of hands and numbness or tingling of feet  Psychiatric: negative  Hematologic/Lymphatic/Immunologic: negative  Endocrine: negative    WOMEN ONLY  Any chance you may be pregnant: No  Age at first period: 12  Date of last period: post menopausal age 58  Number of pregnancies: 3  Live Births: 0  Birth Control pills: Yes  If yes, for how long: 3 years  HRT: No    Radiation Oncology Patient Teaching    Current Concern: R breast IDC ER/NJ+ HER2-     Person involved with teaching:  Patient  Patient asked Questions: Yes  Patient was cooperative: Yes  Patient was receptive (willing to accept information given): Yes    Education Assessment  Comprehension ability: High  Knowledge level: Medium  Factors affecting teaching: None    Education Materials Given  Caring for Your Skin During Radiation, Aquaphor, Fatigue    Educational Topics Discussed  Side effects- see above    Response To Teaching  Verbalizes understanding    Do you have an advanced directive or living will? No  Are you DNR/DNI? No    Vida Niño RN

## 2023-05-22 NOTE — PROGRESS NOTES
Dear Colleagues,  Today Ginger Hernandez was seen in consultation.  IDENTIFICATION: This is a 68 year old woman with right (UIQ) breast cancer, status post lumpectomy and SLNBx, revealing G3 IDCA w/ G3 DCIS, [id number] ER+/[place]+/H2N- disease excised with a close noninvasive margin, followed by TC x4 completed on 5/2/23 referred for adjuvant radiation therapy.   HISTORY OF PRESENT ILLNESS: Ginger Hernandez was in her usual state of health this past year.  On November 18, 2022 screening mammogram showed spiculated mass in the right breast 3 o'clock position.  On December 2, 2022 right breast ultrasound showed a 2.3 cm solid mass with normal sized right axillary lymph nodes.  On December 12, 2022 right breast ultrasound-guided biopsy was consistent with grade 3 IDC with no LVSI, ER/NY positive HER2/lavelle equivocal by IHC.  On January 17, 2023 Dr. Nascimento took her to the OR and she had a right radioactive seed guided lumpectomy and sentinel lymph node biopsy.  Pathology revealed single focus of 3.3 cm grade 3 IDC with grade 3 DCIS.  Negative LVSI.  Negative invasive margins and close lateral margin (1mm).  0 out of 1 involved lymph nodes.   This gave her a stage of pT2 N0 M0, ER/NY positive HER2/lavelle negative.  Post placement digital mammographic and ultrasound imaging demonstrates the radioactive pellet rests in the targeted area. Her postoperative course has been unenventful.  She was then seen by Dr. Jackson.  Her Oncotype score returned back as 36 and she received TC x4 from 3/2/23-5/2/23.  She is to receive adjuvant endocrine therapy after XRT.    Since her surgery, she has continued to heal well and denies any significant pain.  She has good ROM.  She denies any other new masses, skin changes, shortness of breath, chest or bony pain, or new neurologic symptoms. She is being seen here today for consideration of postoperative radiotherapy.  REVIEW OF SYSTEMS: As per HPI, a 14-point review of system is otherwise  negative.  PAST RADIATION THERAPY:  Denies.  PAST CTD/PACEMAKER: Denies  BREAST RISK FACTORS:  No history of prior breast biopsy. No family history of ovarian cancer.  Maternal grandmother and mother had breast cancer. May have had second cousins that are BRCA+. Declining genetic testing at this time.  She started her menses at age 12.  .  Menopause age 58. No history of HRT. OCP use x3 years.    Past Medical History:   Diagnosis Date     Breast cancer (H) 2022    Right Breast     Past Surgical History:   Procedure Laterality Date     BREAST BIOPSY, RT/LT Right 2022     LUMPECTOMY BREAST WITH SENTINEL NODE, COMBINED Right 2023    Dr. Nascimento     No family history on file.  Social History     Tobacco Use     Smoking status: Never     Smokeless tobacco: Never   Vaping Use     Vaping status: Not on file   Substance Use Topics     Alcohol use: Not Currently     Current Outpatient Medications   Medication     celecoxib (CELEBREX) 200 MG capsule     Cholecalciferol 250 MCG (00718 UT) CAPS     clobetasol (TEMOVATE) 0.05 % external ointment     cycloSPORINE (RESTASIS) 0.05 % ophthalmic emulsion     desoximetasone (TOPICORT) 0.25 % external cream     dexamethasone (DECADRON) 4 MG tablet     eletriptan (RELPAX) 40 MG tablet     esomeprazole (NEXIUM) 20 MG DR capsule     fexofenadine (ALLEGRA) 180 MG tablet     fluticasone (FLONASE) 50 MCG/ACT nasal spray     ibuprofen (ADVIL/MOTRIN) 200 MG tablet     ketoconazole (NIZORAL) 2 % external shampoo     lisinopril-hydrochlorothiazide (ZESTORETIC) 10-12.5 MG tablet     magic mouthwash suspension (diphenhydrAMINE, lidocaine, aluminum-magnesium & simethicone)     magnesium 200 MG TABS     metroNIDAZOLE (METROGEL) 1 % external gel     Misc Natural Products (LUTEIN 20 PO)     omeprazole (PRILOSEC) 20 MG DR capsule     ondansetron (ZOFRAN) 8 MG tablet     penciclovir (DENAVIR) 1 % external cream     prochlorperazine (COMPAZINE) 10 MG tablet     rizatriptan  "(MAXALT) 10 MG tablet     SUMAtriptan (IMITREX) 20 MG/ACT nasal spray     valACYclovir (VALTREX) 1000 mg tablet     zolpidem (AMBIEN) 10 MG tablet     No current facility-administered medications for this visit.     Allergies   Allergen Reactions     Iodinated Contrast Media Anaphylaxis and Hives     Other reaction(s): Unknown  HUT Comment: xray dye; HUT Reaction: Anaphylaxis; HUT Reaction: Hives; HUT Severity: High; HUT Noted: 20110301  xray dye  xray dye       Ketamine Other (See Comments)     Other reaction(s): Emotional Disturbance, Unknown  \"complete, emotional reaction\"; HUT Comment: \"complete, emotional reaction\"\"complete, emotional reaction\"; HUT Reaction: Emotional Disturbance; HUT Noted: 20180525  \"complete, emotional reaction\"  \"complete, emotional reaction\"  \"complete, emotional reaction\"  Other reaction(s): Emotional Disturbance  \"complete, emotional reaction\"  \"complete, emotional reaction\"       Nabumetone      Other reaction(s): Gastrointestinal, GI Upset, Unknown  gerd; HUT Comment: gerd; HUT Reaction: Gastrointestinal; HUT Noted: [id number]  gerd  Other reaction(s): Gastrointestinal, GI Upset  gerd  gerd       Pregabalin      Other reaction(s): confusion, Confusion, Confusion, Mental Status Change  HUT Reaction: Confusion; HUT Reaction: Confusion; HUT Noted: 20120315  Other reaction(s): Confusion, Mental Status Change       PHYSICAL EXAMINATION:  BP (P) 130/87   Pulse (P) 87   Temp (P) 98.2  F (36.8  C) (Oral)   Resp (P) 18   Wt (P) 96.2 kg (212 lb)   SpO2 (P) 97%   BMI (P) 36.60 kg/m    GENERAL Well-appearing woman in no acute distress.  HEENT Normocephalic, atraumatic.  Sclerae anicteric.  CVR  Regular rate and rhythm.  No murmurs, rubs, or gallops.  LUNGS Clear to auscultation bilaterally.  BREASTS Breasts are examined in the supine and upright position.  The breasts are symmetric.  Theleft breast is unremarkable, as there is no erythema, ulceration or suspicious nodularity within it.  " Within the right breast and axilla there are two well healed incisions.  Firmness of these incisions is not suspicious.  There is no suspicious erythema, ulceration or nodularity within the right breast.  There is no erythema, retraction, desquamation or discharge appreciated within the right or left nipple areolar complex.    LYMPH No supraclavicular, infraclavicular, or axillary lymphadenopathy appreciated bilaterally.  ABDOMEN Soft.    EXT  No clubbing or cyanosis or edema.    NEURO No focal deficits.  MSK  Good ROM.   SKIN  Warm and well perfused.    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   I recommend adjuvant XRT to improve local control and overall survival.  Plan is to treat the affected breast based on multiple randomized prospective data which show decrease risk of local recurrence by ~50% with the addition of XRT to BCS and the EBCTCG metaanalysis published in Lancet 2011 which shows that for every 4 recurrences avoided by year 10 one breast cancer death is avoided by year 15.  Given her HG disease and close DCIS margin she will also receive a tumor cavity boost based EORTC and Abdul Boost Trials.  The risks, benefits, treatment rationale and regimen of radiation therapy to the breast were discussed in great detail today with the patient.  Risks include but are not limited to skin erythema, desquamation, hyperpigmentation, breast and lymphedema, fibrosis, telengectasia, pneumonitis, cardiac toxicity, rib fracture and secondary malignancy. The patient consented to therapy and will have a CT simulation completed today. Treatment will start within 1 week.    Additional problem list to be  addressed in the following manner:  1. Systemic/hormonal treatment : s/p systemic chemo that was recommended.  Will f/u with Med Onc/Dr. Jackson 1-2 weeks after XRT completed to discuss adjuvant endocrine therapy.   There was ample time for questions and all were answered to the patient's satisfaction. Thank you for allowing me to participate in the care of this pleasant patient. If you have any questions, please do not hesitate to contact my office.    Sincerely,  Vernell Hendrickson MD

## 2023-05-31 ENCOUNTER — APPOINTMENT (OUTPATIENT)
Dept: RADIATION ONCOLOGY | Facility: CLINIC | Age: 68
End: 2023-05-31
Payer: COMMERCIAL

## 2023-05-31 PROCEDURE — 77334 RADIATION TREATMENT AID(S): CPT | Performed by: RADIOLOGY

## 2023-05-31 PROCEDURE — 77300 RADIATION THERAPY DOSE PLAN: CPT | Performed by: RADIOLOGY

## 2023-05-31 PROCEDURE — 77295 3-D RADIOTHERAPY PLAN: CPT | Performed by: RADIOLOGY

## 2023-06-01 ENCOUNTER — THERAPY VISIT (OUTPATIENT)
Dept: OCCUPATIONAL THERAPY | Facility: CLINIC | Age: 68
End: 2023-06-01
Attending: INTERNAL MEDICINE
Payer: COMMERCIAL

## 2023-06-01 DIAGNOSIS — Z17.0 MALIGNANT NEOPLASM OF AREOLA OF RIGHT BREAST IN FEMALE, ESTROGEN RECEPTOR POSITIVE (H): ICD-10-CM

## 2023-06-01 DIAGNOSIS — C50.011 MALIGNANT NEOPLASM OF AREOLA OF RIGHT BREAST IN FEMALE, ESTROGEN RECEPTOR POSITIVE (H): ICD-10-CM

## 2023-06-01 PROCEDURE — 97165 OT EVAL LOW COMPLEX 30 MIN: CPT | Mod: GO

## 2023-06-01 PROCEDURE — 97140 MANUAL THERAPY 1/> REGIONS: CPT | Mod: GO

## 2023-06-01 NOTE — PROGRESS NOTES
OCCUPATIONAL THERAPY EVALUATION  Type of Visit: Evaluation    See electronic medical record for Abuse and Falls Screening details.    Subjective      Presenting condition or subjective complaint: figure out the best fit for therapy  Date of onset: 23    Relevant medical history: -- (complicated PMH, reports 4 lumbar back surgeries, torn cartiledge in L hip, needs R knee replacement.)   Dates & types of surgery: 23 R breast lumpectomy with sentinel node biopsy    Prior diagnostic imaging/testing results:       Prior therapy history for the same diagnosis, illness or injury:  none      Prior Level of Function   Transfers: Assistive equipment  Ambulation: Assistive equipment  ADL: Assistive equipment  IADL: Driving, Finances, Housekeeping, Laundry, Meal preparation, Medication management simple meals, plan to hire help for housekeeping    Living Environment  Social support: Alone   Type of home: [facility]; 2-story   Stairs to enter the home: Yes (typically enters through garage with no steps)       Ramp:   no  Stairs inside the home: Yes 12 Is there a railing: Yes   Help at home: Home and Yard maintenance tasks  Equipment owned: Straight Cane; Walker; Bath bench     Employment: No retired  Hobbies/Interests:      Patient goals for therapy: walk    Pain assessment: Location: R breast/pec/chest/Ratin     Objective   Cognitive Status Examination  Orientation: Oriented to person, place and time   Level of Consciousness: Alert  Follows Commands and Answers Questions: 100% of the time  Personal Safety and Judgement: Intact  Memory: Intact  Attention: tangential but able to redirect  Organization/Problem Solving: NA  Executive Function: NA    VISUAL SKILLS  Visual Acuity: Pt reports impaired vision, needs to see an eye doctor  Visual Field: NA  Visual Attention: NA  Oculomotor: NA    SENSATION: n/t in fingertips since chemo    POSTURE: Standing Posture: Rounded shoulders, Forward head  RANGE OF MOTION: R  shoulder ROM WFL but does not have full ROM  STRENGTH: overall weak/deconditioned  MUSCLE TONE: WNL  COORDINATION: NT  BALANCE: defer to PT to eval    FUNCTIONAL MOBILITY  Assistive Device(s): Cane (single end)  Ambulation: very slow with SPC, shuffling gait  Wheelchair: needs assist to push manual w/c, only uses at the clinic    BED MOBILITY: WFL    TRANSFERS: WFL    BATHING: WFL  Equipment: Hand-held shower head, Shower chair/Tub bench    UPPER BODY DRESSING: WFL  Equipment: none    LOWER BODY DRESSING: WFL  Equipment: none    TOILETING: WNL  Equipment: higher seat    GROOMING: WNL  Equipment: none    EATING/SELF FEEDING: Independent   Equipment: none    ACTIVITY TOLERANCE: poor due to pain and deconditioning    INSTRUMENTAL ACTIVITIES OF DAILY LIVING (IADL): currently indep but looking to get help with housekeeping. [facility] [employer] [facility] takes care for lawn/snow.   Meal Planning/Prep: simple meals  Home/Financial Management: no concerns  Communication/Computer Use: no concerns  Community Mobility: able to drive herself  Care of Others: none    FACIT 36 (out of 52, the higher the number the more impairment)    Assessment & Plan   CLINICAL IMPRESSIONS   Medical Diagnosis: C50.011, Z17.0 (ICD-10-CM) - Malignant neoplasm of areola of right breast in female, estrogen receptor positive (H)    Treatment Diagnosis: Impaired RUE ROM and skin integrity    Impression/Assessment: Pt is a 68 year old female presenting to Occupational Therapy due to pain and decreased ROM in RUE after treatment for breast cancer.  The following significant findings have been identified: Impaired activity tolerance, Impaired mobility, Impaired ROM and Pain.  These identified deficits interfere with their ability to perform self care tasks and household chores as compared to previous level of function.     Clinical Decision Making (Complexity):   Assessment of Occupational Performance: 1-3 Performance Deficits  Occupational Performance  Limitations: dressing, leisure activities and social participation  Clinical Decision Making (Complexity): Low complexity    PLAN OF CARE  Treatment Interventions:   Interventions: Self-Care/Home Management, Therapeutic Exercise, Manual Therapy    Long Term Goals   OT Goal 1  Goal Identifier: Home management  Goal Description: Pt will verbalize understanding of long term management/prevention of edema and seroma including following recommended wear schedule for compression garments, manual techniques, skin care and exercise, following recommended management techniques 6/7 days.  Rationale: In order to maximize safety and independence with performance of self-care activities  Target Date: 08/29/23  OT Goal 2  Goal Identifier: SPADI  Goal Description: Pt will demonstrate improved function in RUE as measured by a decrease in SPADI score.  Rationale: In order to maximize safety and independence with performance of self-care activities  Target Date: 08/29/23      Frequency of Treatment: 1x a week  Duration of Treatment: 5 weeks     Recommended Referrals to Other Professionals: Physical Therapy  Education Assessment:       Risks and benefits of evaluation/treatment have been explained.   Patient/Family/caregiver agrees with Plan of Care.     Evaluation Time:    OT Eval, Low Complexity Minutes (21578): 20    Signing Clinician: Salena Tse OT        [facility]                                                                                   OUTPATIENT OCCUPATIONAL THERAPY      PLAN OF TREATMENT FOR OUTPATIENT REHABILITATION   Patient's Last Name, First Name, Ginger Grubbs YOB: 1955   Provider's Name   [facility]   Medical Record No.  [id number]     Onset Date: 01/17/23 Start of Care Date: 06/01/23     Medical Diagnosis:  C50.011, Z17.0 (ICD-10-CM) - Malignant neoplasm of areola of right breast in female, estrogen receptor positive  (H)      OT Treatment Diagnosis:  Impaired RUE ROM and skin integrity Plan of Treatment  Frequency/Duration:1x a week/5 weeks    Certification date from 06/01/23   To 08/29/23        See note for plan of treatment details and functional goals     Salena Tse OT                         I CERTIFY THE NEED FOR THESE SERVICES FURNISHED UNDER        THIS PLAN OF TREATMENT AND WHILE UNDER MY CARE .             Physician Signature               Date    X_____________________________________________________                        Referring Provider:  Fartun Jackson      Initial Assessment  See Epic Evaluation- 06/01/23

## 2023-06-05 ENCOUNTER — APPOINTMENT (OUTPATIENT)
Dept: RADIATION ONCOLOGY | Facility: CLINIC | Age: 68
End: 2023-06-05
Payer: COMMERCIAL

## 2023-06-05 PROCEDURE — 77280 THER RAD SIMULAJ FIELD SMPL: CPT | Performed by: RADIOLOGY

## 2023-06-06 ENCOUNTER — OFFICE VISIT (OUTPATIENT)
Dept: RADIATION ONCOLOGY | Facility: CLINIC | Age: 68
End: 2023-06-06
Payer: COMMERCIAL

## 2023-06-06 ENCOUNTER — APPOINTMENT (OUTPATIENT)
Dept: RADIATION ONCOLOGY | Facility: CLINIC | Age: 68
End: 2023-06-06
Payer: COMMERCIAL

## 2023-06-06 ENCOUNTER — TELEPHONE (OUTPATIENT)
Dept: RADIATION ONCOLOGY | Facility: CLINIC | Age: 68
End: 2023-06-06

## 2023-06-06 VITALS
SYSTOLIC BLOOD PRESSURE: 147 MMHG | RESPIRATION RATE: 16 BRPM | TEMPERATURE: 99 F | OXYGEN SATURATION: 96 % | BODY MASS INDEX: 36.53 KG/M2 | HEART RATE: 83 BPM | WEIGHT: 211.6 LBS | DIASTOLIC BLOOD PRESSURE: 92 MMHG

## 2023-06-06 DIAGNOSIS — C50.211 MALIGNANT NEOPLASM OF UPPER-INNER QUADRANT OF RIGHT FEMALE BREAST, UNSPECIFIED ESTROGEN RECEPTOR STATUS (H): Primary | ICD-10-CM

## 2023-06-06 PROCEDURE — 99207 PR DROP WITH A PROCEDURE: CPT | Performed by: RADIOLOGY

## 2023-06-06 PROCEDURE — 77412 RADIATION TX DELIVERY LVL 3: CPT | Performed by: RADIOLOGY

## 2023-06-06 NOTE — TELEPHONE ENCOUNTER
Patient called in today to confirm her radiation appointment. I tried to transfer her over to the radiation  however she said she didn't want to be transferred and is so sick of all of her appointments changing and it's so frustrating getting so many reminders along with all the changes they make without notifying her and she was very upset about this. I let her know she should pass that along to the radiation team and I will also note this frustration. HB

## 2023-06-06 NOTE — LETTER
2023         RE: Ginger Hernandez  [address] [facility] [address]        Dear Colleague,    Thank you for referring your patient, Ginger Hernandez, to the [facility] RADIATION ONCOLOGY [place]. Please see a copy of my visit note below.    [facility] PHYSICIANS  SPECIALIZING IN BREAKTHROUGHS  Radiation Oncology    On Treatment Visit Note      Ginger Hernandez      Date: 2023   MRN: [id number]   : 1955  Diagnosis: R breast IDC ER/ND+ HER2-      Reason for Visit:  On Radiation Treatment Visit     Treatment Summary to Date  Treatment Site: R breast + bst Current Dose: 266/5656 cGy Fractions:       Chemotherapy  Chemo concurrent with radx?: No (Dr. Jackson)    Subjective:     First day of treatment today.  Breast considerable sadness regarding dealing with long list of noncancer related medical concerns, losing job and then being diagnosed with cancer.      Nursing ROS:   Nutrition Alteration  Diet Type: Patient's Preference  Skin  Skin Reaction: 0 - No changes  Skin Intervention: Aquaphor BID        Cardiovascular  Respiratory effort: 1 - Normal - without distress        Psychosocial  Psychosocial Note: Patient expresses frustrating with past 8 years of doctoring due to health concerns and no longer working at a job she enjoyed. Provided emotional support, Akil Pacheco offered. Patient to stop at  to schedule, she was uncertain at this time.  Pain Assessment  0-10 Pain Scale: 4  Pain Note: hip pain      Objective:   BP (!) 147/92   Pulse 83   Temp 99  F (37.2  C) (Oral)   Resp 16   Wt 96 kg (211 lb 9.6 oz)   SpO2 96%   BMI 36.53 kg/m    Gen: Appears well, in no acute distress  Skin: No erythema    Labs:  CBC RESULTS: Recent Labs   Lab Test 23  1034   WBC 8.8   RBC 4.36   HGB 12.9   HCT 38.9   MCV 89   MCH 29.6   MCHC 33.2   RDW 16.4*        ELECTROLYTES:  Recent Labs   Lab Test 23  1034      POTASSIUM 3.8   CHLORIDE  105   IBETH 9.2   CO2 29   BUN 17   CR 0.97   *       Assessment:    Tolerating radiation therapy well.  All questions and concerns addressed.  Emotional support provided today.    Plan:   1. Continue current therapy.    2. Akil Pacheco was recommended and patient politely refused  3. Skin care as previously directed      Mosaiq chart and setup information reviewed  Ports checked    Medication Review  Med list reviewed with patient?: Yes    Educational Topic Discussed  Education Instructions: Skin cares, fatigue, emotional supportive services Akil Hendrickson MD    Please do not send letter to referring physician.          Again, thank you for allowing me to participate in the care of your patient.        Sincerely,        GAIL Hendrickson MD

## 2023-06-07 ENCOUNTER — APPOINTMENT (OUTPATIENT)
Dept: RADIATION ONCOLOGY | Facility: CLINIC | Age: 68
End: 2023-06-07
Payer: COMMERCIAL

## 2023-06-07 PROCEDURE — 77412 RADIATION TX DELIVERY LVL 3: CPT | Performed by: SURGERY

## 2023-06-07 NOTE — PROGRESS NOTES
[facility] PHYSICIANS  SPECIALIZING IN BREAKTHROUGHS  Radiation Oncology    On Treatment Visit Note      Ginger Hernandez      Date: 2023   MRN: [id number]   : 1955  Diagnosis: R breast IDC ER/[place]+ HER2-      Reason for Visit:  On Radiation Treatment Visit     Treatment Summary to Date  Treatment Site: R breast + bst Current Dose: 266/5656 cGy Fractions:       Chemotherapy  Chemo concurrent with radx?: No (Dr. Jackson)    Subjective:     First day of treatment today.  Breast considerable sadness regarding dealing with long list of noncancer related medical concerns, losing job and then being diagnosed with cancer.      Nursing ROS:   Nutrition Alteration  Diet Type: Patient's Preference  Skin  Skin Reaction: 0 - No changes  Skin Intervention: Aquaphor BID        Cardiovascular  Respiratory effort: 1 - Normal - without distress        Psychosocial  Psychosocial Note: Patient expresses frustrating with past 8 years of doctoring due to health concerns and no longer working at a job she enjoyed. Provided emotional support, Akil Pacheco offered. Patient to stop at  to schedule, she was uncertain at this time.  Pain Assessment  0-10 Pain Scale: 4  Pain Note: hip pain      Objective:   BP (!) 147/92   Pulse 83   Temp 99  F (37.2  C) (Oral)   Resp 16   Wt 96 kg (211 lb 9.6 oz)   SpO2 96%   BMI 36.53 kg/m    Gen: Appears well, in no acute distress  Skin: No erythema    Labs:  CBC RESULTS: Recent Labs   Lab Test 23  1034   WBC 8.8   RBC 4.36   HGB 12.9   HCT 38.9   MCV 89   MCH 29.6   MCHC 33.2   RDW 16.4*        ELECTROLYTES:  Recent Labs   Lab Test 23  1034      POTASSIUM 3.8   CHLORIDE 105   IBETH 9.2   CO2 29   BUN 17   CR 0.97   *       Assessment:    Tolerating radiation therapy well.  All questions and concerns addressed.  Emotional support provided today.    Plan:   1. Continue current therapy.    2. Akil Pacheco was recommended and patient  politely refused  3. Skin care as previously directed      Mosaiq chart and setup information reviewed  Ports checked    Medication Review  Med list reviewed with patient?: Yes    Educational Topic Discussed  Education Instructions: Skin cares, fatigue, emotional supportive services Akil Hendrickson MD    Please do not send letter to referring physician.

## 2023-06-08 ENCOUNTER — THERAPY VISIT (OUTPATIENT)
Dept: OCCUPATIONAL THERAPY | Facility: CLINIC | Age: 68
End: 2023-06-08
Attending: INTERNAL MEDICINE
Payer: COMMERCIAL

## 2023-06-08 ENCOUNTER — APPOINTMENT (OUTPATIENT)
Dept: RADIATION ONCOLOGY | Facility: CLINIC | Age: 68
End: 2023-06-08
Payer: COMMERCIAL

## 2023-06-08 DIAGNOSIS — C50.011 MALIGNANT NEOPLASM OF AREOLA OF RIGHT BREAST IN FEMALE, ESTROGEN RECEPTOR POSITIVE (H): Primary | ICD-10-CM

## 2023-06-08 DIAGNOSIS — Z17.0 MALIGNANT NEOPLASM OF AREOLA OF RIGHT BREAST IN FEMALE, ESTROGEN RECEPTOR POSITIVE (H): Primary | ICD-10-CM

## 2023-06-08 PROCEDURE — 77412 RADIATION TX DELIVERY LVL 3: CPT | Performed by: SURGERY

## 2023-06-08 PROCEDURE — 97140 MANUAL THERAPY 1/> REGIONS: CPT | Mod: GO

## 2023-06-09 ENCOUNTER — APPOINTMENT (OUTPATIENT)
Dept: RADIATION ONCOLOGY | Facility: CLINIC | Age: 68
End: 2023-06-09
Payer: COMMERCIAL

## 2023-06-09 PROCEDURE — 77412 RADIATION TX DELIVERY LVL 3: CPT | Performed by: SURGERY

## 2023-06-12 ENCOUNTER — APPOINTMENT (OUTPATIENT)
Dept: RADIATION ONCOLOGY | Facility: CLINIC | Age: 68
End: 2023-06-12
Payer: COMMERCIAL

## 2023-06-12 PROCEDURE — 77412 RADIATION TX DELIVERY LVL 3: CPT | Performed by: RADIOLOGY

## 2023-06-12 PROCEDURE — 77427 RADIATION TX MANAGEMENT X5: CPT | Performed by: RADIOLOGY

## 2023-06-13 ENCOUNTER — APPOINTMENT (OUTPATIENT)
Dept: RADIATION ONCOLOGY | Facility: CLINIC | Age: 68
End: 2023-06-13
Payer: COMMERCIAL

## 2023-06-13 PROCEDURE — 77417 THER RADIOLOGY PORT IMAGE(S): CPT | Performed by: RADIOLOGY

## 2023-06-13 PROCEDURE — 77412 RADIATION TX DELIVERY LVL 3: CPT | Performed by: RADIOLOGY

## 2023-06-14 ENCOUNTER — OFFICE VISIT (OUTPATIENT)
Dept: RADIATION ONCOLOGY | Facility: CLINIC | Age: 68
End: 2023-06-14
Payer: COMMERCIAL

## 2023-06-14 ENCOUNTER — APPOINTMENT (OUTPATIENT)
Dept: RADIATION ONCOLOGY | Facility: CLINIC | Age: 68
End: 2023-06-14
Payer: COMMERCIAL

## 2023-06-14 VITALS
DIASTOLIC BLOOD PRESSURE: 103 MMHG | BODY MASS INDEX: 36.08 KG/M2 | OXYGEN SATURATION: 98 % | WEIGHT: 209 LBS | HEART RATE: 78 BPM | TEMPERATURE: 98.5 F | SYSTOLIC BLOOD PRESSURE: 157 MMHG | RESPIRATION RATE: 18 BRPM

## 2023-06-14 DIAGNOSIS — C50.211 MALIGNANT NEOPLASM OF UPPER-INNER QUADRANT OF RIGHT FEMALE BREAST, UNSPECIFIED ESTROGEN RECEPTOR STATUS (H): Primary | ICD-10-CM

## 2023-06-14 PROCEDURE — 99207 PR DROP WITH A PROCEDURE: CPT | Performed by: RADIOLOGY

## 2023-06-14 PROCEDURE — 77412 RADIATION TX DELIVERY LVL 3: CPT | Performed by: RADIOLOGY

## 2023-06-14 ASSESSMENT — PAIN SCALES - GENERAL: PAINLEVEL: MILD PAIN (2)

## 2023-06-14 NOTE — LETTER
2023         RE: Ginger Hernandez  [address] [facility] [address]        Dear Colleague,    Thank you for referring your patient, Ginger Hernandez, to the [facility] RADIATION ONCOLOGY [place]. Please see a copy of my visit note below.    [facility] PHYSICIANS  SPECIALIZING IN BREAKTHROUGHS  Radiation Oncology    On Treatment Visit Note      Ginger Hernandez      Date: 2023   MRN: [id number]   : 1955  Diagnosis: R breast IDC ER/[place]+ HER2-      Reason for Visit:  On Radiation Treatment Visit     Treatment Summary to Date  Treatment Site: R breast + bst Current Dose: 1862/5656 cGy Fractions:       Chemotherapy  Chemo concurrent with radx?: No (Dr. Jackson)    Subjective:     Early treatment doing well with no complaints.  Has not started using Aquaphor yet.    Nursing ROS:   Nutrition Alteration  Diet Type: Patient's Preference  Skin  Skin Reaction: 0 - No changes  Skin Intervention: Patient reports she is using Aveeno lotion at home, she will  Aquaphor and apply BID        Cardiovascular  Respiratory effort: 1 - Normal - without distress        Psychosocial  Psychosocial Note: Patient denies fatigue  Pain Assessment  0-10 Pain Scale: 2  Pain Note: hip pain, osteoarthritis pain      Objective:   BP (!) 157/103   Pulse 78   Temp 98.5  F (36.9  C) (Oral)   Resp 18   Wt 94.8 kg (209 lb)   SpO2 98%   BMI 36.08 kg/m    Gen: Appears well, in no acute distress  Skin: No erythema    Labs:  CBC RESULTS: Recent Labs   Lab Test 23  1034   WBC 8.8   RBC 4.36   HGB 12.9   HCT 38.9   MCV 89   MCH 29.6   MCHC 33.2   RDW 16.4*        ELECTROLYTES:  Recent Labs   Lab Test 23  1034      POTASSIUM 3.8   CHLORIDE 105   IBETH 9.2   CO2 29   BUN 17   CR 0.97   *       Assessment:    Tolerating radiation therapy well.  All questions and concerns addressed.    Plan:   1. Continue current therapy.    2. Aquaphor to radiation treatment  field 3-4 times per day.      Mosaiq chart and setup information reviewed  Ports checked    Medication Review  Med list reviewed with patient?: Yes    Educational Topic Discussed  Education Instructions: Skin cares, fatigue, emotional supportive services Akil Hendrickson MD    Please do not send letter to referring physician.          Again, thank you for allowing me to participate in the care of your patient.        Sincerely,        GAIL Hendrickson MD

## 2023-06-14 NOTE — PROGRESS NOTES
[facility] PHYSICIANS  SPECIALIZING IN BREAKTHROUGHS  Radiation Oncology    On Treatment Visit Note      Ginger Hernandez      Date: 2023   MRN: [id number]   : 1955  Diagnosis: R breast IDC ER/OK+ HER2-      Reason for Visit:  On Radiation Treatment Visit     Treatment Summary to Date  Treatment Site: R breast + bst Current Dose: 1862/5656 cGy Fractions:       Chemotherapy  Chemo concurrent with radx?: No (Dr. Jackson)    Subjective:     Early treatment doing well with no complaints.  Has not started using Aquaphor yet.    Nursing ROS:   Nutrition Alteration  Diet Type: Patient's Preference  Skin  Skin Reaction: 0 - No changes  Skin Intervention: Patient reports she is using Aveeno lotion at home, she will  Aquaphor and apply BID        Cardiovascular  Respiratory effort: 1 - Normal - without distress        Psychosocial  Psychosocial Note: Patient denies fatigue  Pain Assessment  0-10 Pain Scale: 2  Pain Note: hip pain, osteoarthritis pain      Objective:   BP (!) 157/103   Pulse 78   Temp 98.5  F (36.9  C) (Oral)   Resp 18   Wt 94.8 kg (209 lb)   SpO2 98%   BMI 36.08 kg/m    Gen: Appears well, in no acute distress  Skin: No erythema    Labs:  CBC RESULTS: Recent Labs   Lab Test 23  1034   WBC 8.8   RBC 4.36   HGB 12.9   HCT 38.9   MCV 89   MCH 29.6   MCHC 33.2   RDW 16.4*        ELECTROLYTES:  Recent Labs   Lab Test 23  1034      POTASSIUM 3.8   CHLORIDE 105   IBETH 9.2   CO2 29   BUN 17   CR 0.97   *       Assessment:    Tolerating radiation therapy well.  All questions and concerns addressed.    Plan:   1. Continue current therapy.    2. Aquaphor to radiation treatment field 3-4 times per day.      Mosaiq chart and setup information reviewed  Ports checked    Medication Review  Med list reviewed with patient?: Yes    Educational Topic Discussed  Education Instructions: Skin cares, fatigue, emotional supportive services Akil  Junior Hendrickson MD    Please do not send letter to referring physician.

## 2023-06-15 ENCOUNTER — APPOINTMENT (OUTPATIENT)
Dept: RADIATION ONCOLOGY | Facility: CLINIC | Age: 68
End: 2023-06-15
Payer: COMMERCIAL

## 2023-06-15 PROCEDURE — 77412 RADIATION TX DELIVERY LVL 3: CPT | Performed by: SURGERY

## 2023-06-16 ENCOUNTER — APPOINTMENT (OUTPATIENT)
Dept: RADIATION ONCOLOGY | Facility: CLINIC | Age: 68
End: 2023-06-16
Payer: COMMERCIAL

## 2023-06-16 PROCEDURE — 77334 RADIATION TREATMENT AID(S): CPT | Performed by: RADIOLOGY

## 2023-06-16 PROCEDURE — 77307 TELETHX ISODOSE PLAN CPLX: CPT | Performed by: RADIOLOGY

## 2023-06-16 PROCEDURE — 77412 RADIATION TX DELIVERY LVL 3: CPT | Performed by: SURGERY

## 2023-06-19 ENCOUNTER — APPOINTMENT (OUTPATIENT)
Dept: RADIATION ONCOLOGY | Facility: CLINIC | Age: 68
End: 2023-06-19
Payer: COMMERCIAL

## 2023-06-19 PROCEDURE — 77336 RADIATION PHYSICS CONSULT: CPT | Performed by: RADIOLOGY

## 2023-06-19 PROCEDURE — 77417 THER RADIOLOGY PORT IMAGE(S): CPT | Performed by: RADIOLOGY

## 2023-06-19 PROCEDURE — 77427 RADIATION TX MANAGEMENT X5: CPT | Performed by: RADIOLOGY

## 2023-06-19 PROCEDURE — 77412 RADIATION TX DELIVERY LVL 3: CPT | Performed by: RADIOLOGY

## 2023-06-20 ENCOUNTER — APPOINTMENT (OUTPATIENT)
Dept: RADIATION ONCOLOGY | Facility: CLINIC | Age: 68
End: 2023-06-20
Payer: COMMERCIAL

## 2023-06-20 PROCEDURE — 77412 RADIATION TX DELIVERY LVL 3: CPT | Performed by: RADIOLOGY

## 2023-06-21 ENCOUNTER — OFFICE VISIT (OUTPATIENT)
Dept: RADIATION ONCOLOGY | Facility: CLINIC | Age: 68
End: 2023-06-21
Payer: COMMERCIAL

## 2023-06-21 ENCOUNTER — APPOINTMENT (OUTPATIENT)
Dept: RADIATION ONCOLOGY | Facility: CLINIC | Age: 68
End: 2023-06-21
Payer: COMMERCIAL

## 2023-06-21 VITALS
HEART RATE: 93 BPM | BODY MASS INDEX: 35.56 KG/M2 | SYSTOLIC BLOOD PRESSURE: 164 MMHG | OXYGEN SATURATION: 98 % | DIASTOLIC BLOOD PRESSURE: 91 MMHG | WEIGHT: 206 LBS | TEMPERATURE: 99.3 F | RESPIRATION RATE: 18 BRPM

## 2023-06-21 DIAGNOSIS — C50.211 MALIGNANT NEOPLASM OF UPPER-INNER QUADRANT OF RIGHT FEMALE BREAST, UNSPECIFIED ESTROGEN RECEPTOR STATUS (H): Primary | ICD-10-CM

## 2023-06-21 PROCEDURE — 99207 PR DROP WITH A PROCEDURE: CPT | Performed by: RADIOLOGY

## 2023-06-21 PROCEDURE — 77412 RADIATION TX DELIVERY LVL 3: CPT | Performed by: RADIOLOGY

## 2023-06-21 ASSESSMENT — PAIN SCALES - GENERAL: PAINLEVEL: MODERATE PAIN (4)

## 2023-06-21 NOTE — LETTER
2023         RE: Ginger Hernandez  [address] [facility] [address]        Dear Colleague,    Thank you for referring your patient, Ginger Hernandez, to the [facility] RADIATION [facility]COLOG[facility] [place]. Please see a copy of my visit note below.    [facility] PHYSICIANS  SPECIALIZING IN BREAKTHROUGHS  Radiation Oncology    On Treatment Visit Note      Ginger Hernandez      Date: 2023   MRN: [id number]   : 1955  Diagnosis: R breast IDC ER/[place]+ HER2-      Reason for Visit:  On Radiation Treatment Visit     Treatment Summary to Date  Treatment Site: R breast + bst Current Dose: 3192/5656 cGy Fractions:       Chemotherapy  Chemo concurrent with radx?: No (Dr. Jackson)    Subjective:     Symptoms are stable.  No new complaints today    Nursing ROS:   Nutrition Alteration  Diet Type: Patient's Preference  Skin  Skin Reaction: 1 - Faint erythema or dry desquamation  Skin Intervention: Patient reports she did buy Aquaphor and Vanicream now, recommended she use Aquaphor 2-3 x day. Reviewed she may put in fridge and then apply.  Skin Note: Patient reports mild breast tenderness, discussed cool packs TID.        Cardiovascular  Respiratory effort: 1 - Normal - without distress        Psychosocial  Psychosocial Note: Patient reports increased fatigue  Pain Assessment  0-10 Pain Scale: 4  Pain Note: hip pain, osteoarthritis pain      Objective:   BP (!) 164/91   Pulse 93   Temp 99.3  F (37.4  C) (Oral)   Resp 18   Wt 93.4 kg (206 lb)   SpO2 98%   BMI 35.56 kg/m    Gen: Appears well, in no acute distress  Skin: minimal diffuse erythema over treatment field    Labs:  CBC RESULTS: Recent Labs   Lab Test 23  1034   WBC 8.8   RBC 4.36   HGB 12.9   HCT 38.9   MCV 89   MCH 29.6   MCHC 33.2   RDW 16.4*        ELECTROLYTES:  Recent Labs   Lab Test 23  1034      POTASSIUM 3.8   CHLORIDE 105   IBETH 9.2   CO2 29   BUN 17   CR 0.97   *        Assessment:    Tolerating radiation therapy well.  All questions and concerns addressed.    Toxicities:  Fatigue: Grade 1: Fatigue relieved by rest  Pain: Grade 0: No toxicity  Dermatitis: Grade 1: Faint erythema or dry desquamation    Plan:   1. Continue current therapy.    2. Skin care per above.      Mosaiq chart and setup information reviewed  Ports checked    Medication Review  Med list reviewed with patient?: Yes    Educational Topic Discussed  Education Instructions: Skin cares, fatigue        Vernell Hendrickson MD    Please do not send letter to referring physician.          Again, thank you for allowing me to participate in the care of your patient.        Sincerely,        GAIL Hendrickson MD

## 2023-06-22 ENCOUNTER — APPOINTMENT (OUTPATIENT)
Dept: RADIATION ONCOLOGY | Facility: CLINIC | Age: 68
End: 2023-06-22
Payer: COMMERCIAL

## 2023-06-22 PROCEDURE — 77412 RADIATION TX DELIVERY LVL 3: CPT | Performed by: RADIOLOGY

## 2023-06-22 NOTE — PROGRESS NOTES
[facility] PHYSICIANS  SPECIALIZING IN BREAKTHROUGHS  Radiation Oncology    On Treatment Visit Note      Ginger Hernandez      Date: 2023   MRN: [id number]   : 1955  Diagnosis: R breast IDC ER/[place]+ HER2-      Reason for Visit:  On Radiation Treatment Visit     Treatment Summary to Date  Treatment Site: R breast + bst Current Dose: 3192/5656 cGy Fractions:       Chemotherapy  Chemo concurrent with radx?: No (Dr. Jackson)    Subjective:     Symptoms are stable.  No new complaints today    Nursing ROS:   Nutrition Alteration  Diet Type: Patient's Preference  Skin  Skin Reaction: 1 - Faint erythema or dry desquamation  Skin Intervention: Patient reports she did buy Aquaphor and Vanicream now, recommended she use Aquaphor 2-3 x day. Reviewed she may put in fridge and then apply.  Skin Note: Patient reports mild breast tenderness, discussed cool packs TID.        Cardiovascular  Respiratory effort: 1 - Normal - without distress        Psychosocial  Psychosocial Note: Patient reports increased fatigue  Pain Assessment  0-10 Pain Scale: 4  Pain Note: hip pain, osteoarthritis pain      Objective:   BP (!) 164/91   Pulse 93   Temp 99.3  F (37.4  C) (Oral)   Resp 18   Wt 93.4 kg (206 lb)   SpO2 98%   BMI 35.56 kg/m    Gen: Appears well, in no acute distress  Skin: minimal diffuse erythema over treatment field    Labs:  CBC RESULTS: Recent Labs   Lab Test 23  1034   WBC 8.8   RBC 4.36   HGB 12.9   HCT 38.9   MCV 89   MCH 29.6   MCHC 33.2   RDW 16.4*        ELECTROLYTES:  Recent Labs   Lab Test 23  1034      POTASSIUM 3.8   CHLORIDE 105   IBETH 9.2   CO2 29   BUN 17   CR 0.97   *       Assessment:    Tolerating radiation therapy well.  All questions and concerns addressed.    Toxicities:  Fatigue: Grade 1: Fatigue relieved by rest  Pain: Grade 0: No toxicity  Dermatitis: Grade 1: Faint erythema or dry desquamation    Plan:   1. Continue current therapy.    2. Skin  care per above.      Mosaiq chart and setup information reviewed  Ports checked    Medication Review  Med list reviewed with patient?: Yes    Educational Topic Discussed  Education Instructions: Skin cares, fatigue        Vernell Hendrickson MD    Please do not send letter to referring physician.

## 2023-06-23 ENCOUNTER — APPOINTMENT (OUTPATIENT)
Dept: RADIATION ONCOLOGY | Facility: CLINIC | Age: 68
End: 2023-06-23
Payer: COMMERCIAL

## 2023-06-23 PROCEDURE — 77412 RADIATION TX DELIVERY LVL 3: CPT | Performed by: RADIOLOGY

## 2023-06-26 ENCOUNTER — APPOINTMENT (OUTPATIENT)
Dept: RADIATION ONCOLOGY | Facility: CLINIC | Age: 68
End: 2023-06-26
Payer: COMMERCIAL

## 2023-06-26 PROCEDURE — 77336 RADIATION PHYSICS CONSULT: CPT | Mod: 59 | Performed by: RADIOLOGY

## 2023-06-26 PROCEDURE — 77280 THER RAD SIMULAJ FIELD SMPL: CPT | Performed by: RADIOLOGY

## 2023-06-26 PROCEDURE — 77412 RADIATION TX DELIVERY LVL 3: CPT | Performed by: RADIOLOGY

## 2023-06-26 PROCEDURE — 77427 RADIATION TX MANAGEMENT X5: CPT | Performed by: RADIOLOGY

## 2023-06-27 ENCOUNTER — APPOINTMENT (OUTPATIENT)
Dept: RADIATION ONCOLOGY | Facility: CLINIC | Age: 68
End: 2023-06-27
Payer: COMMERCIAL

## 2023-06-27 PROCEDURE — 77412 RADIATION TX DELIVERY LVL 3: CPT | Performed by: RADIOLOGY

## 2023-06-28 ENCOUNTER — APPOINTMENT (OUTPATIENT)
Dept: RADIATION ONCOLOGY | Facility: CLINIC | Age: 68
End: 2023-06-28
Payer: COMMERCIAL

## 2023-06-28 ENCOUNTER — OFFICE VISIT (OUTPATIENT)
Dept: RADIATION ONCOLOGY | Facility: CLINIC | Age: 68
End: 2023-06-28
Payer: COMMERCIAL

## 2023-06-28 VITALS
OXYGEN SATURATION: 97 % | WEIGHT: 205 LBS | SYSTOLIC BLOOD PRESSURE: 126 MMHG | RESPIRATION RATE: 16 BRPM | TEMPERATURE: 98.2 F | BODY MASS INDEX: 35.39 KG/M2 | HEART RATE: 83 BPM | DIASTOLIC BLOOD PRESSURE: 79 MMHG

## 2023-06-28 DIAGNOSIS — C50.211 MALIGNANT NEOPLASM OF UPPER-INNER QUADRANT OF RIGHT FEMALE BREAST, UNSPECIFIED ESTROGEN RECEPTOR STATUS (H): Primary | ICD-10-CM

## 2023-06-28 PROCEDURE — 77412 RADIATION TX DELIVERY LVL 3: CPT | Performed by: RADIOLOGY

## 2023-06-28 PROCEDURE — 99207 PR DROP WITH A PROCEDURE: CPT | Performed by: RADIOLOGY

## 2023-06-28 ASSESSMENT — PAIN SCALES - GENERAL: PAINLEVEL: MILD PAIN (3)

## 2023-06-28 NOTE — LETTER
2023         RE: Ginger Hernandez  [address] [facility] [address]        Dear Colleague,    Thank you for referring your patient, Ginger Hernandez, to the [facility] RADIATION ONCOLOG[facility] [place]. Please see a copy of my visit note below.    No skin pregnancy.  [facility]  SPECIALIZING IN BREAKTHROUGHS  Radiation Oncology    On Treatment Visit Note      Ginger Hernandez      Date: 2023   MRN: [id number]   : 1955  Diagnosis: R breast IDC ER/[place]+ HER2-      Reason for Visit:  On Radiation Treatment Visit     Treatment Summary to Date  Treatment Site: R breast + bst Current Dose: 4456/5656 cGy Fractions:       Chemotherapy  Chemo concurrent with radx?: No (Dr. Jackson)    Subjective:     Symptoms are stable    Nursing ROS:   Nutrition Alteration  Diet Type: Patient's Preference  Skin  Skin Reaction: 1 - Faint erythema or dry desquamation  Skin Intervention: Aquaphor 2-3 x day. Reviewed she may put in fridge and then apply.  Skin Note: Patient reports mild breast tenderness, discussed cool packs TID.        Cardiovascular  Respiratory effort: 1 - Normal - without distress        Psychosocial  Psychosocial Note: Patient reports increased fatigue  Pain Assessment  0-10 Pain Scale: 4  Pain Note: hip pain, osteoarthritis pain      Objective:   /79   Pulse 83   Temp 98.2  F (36.8  C) (Oral)   Resp 16   Wt 93 kg (205 lb)   SpO2 97%   BMI 35.39 kg/m    Gen: Appears well, in no acute distress  Skin: minimal diffuse erythema over treatment field    Labs:  CBC RESULTS: Recent Labs   Lab Test 23  1034   WBC 8.8   RBC 4.36   HGB 12.9   HCT 38.9   MCV 89   MCH 29.6   MCHC 33.2   RDW 16.4*        ELECTROLYTES:  Recent Labs   Lab Test 23  1034      POTASSIUM 3.8   CHLORIDE 105   IBETH 9.2   CO2 29   BUN 17   CR 0.97   *       Assessment:    Tolerating radiation therapy well.  All questions and  concerns addressed.    Toxicities:  Fatigue: Grade 1: Fatigue relieved by rest  Pain: Grade 1: Mild pain  Dermatitis: Grade 1: Faint erythema or dry desquamation    Plan:   1. Continue current therapy.    2. Skin care per above.      Mosaiq chart and setup information reviewed  Ports checked    Medication Review  Med list reviewed with patient?: Yes    Educational Topic Discussed  Additional Instructions: Follow up with Danisha French NP in 1 and 3.5 months, follow up with Dr. Jackson 7/18/23.  Education Instructions: Skin cares, fatigue        Vernell Hendrickson MD    Please do not send letter to referring physician.          Again, thank you for allowing me to participate in the care of your patient.        Sincerely,        GAIL Hendrickson MD

## 2023-06-28 NOTE — PROGRESS NOTES
No skin pregnancy.  Thinks weight yesterday [facility] PHYSICIANS  SPECIALIZING IN BREAKTHROUGHS  Radiation Oncology    On Treatment Visit Note      Ginger Hernandez      Date: 2023   MRN: [id number]   : 1955  Diagnosis: R breast IDC ER/ID+ HER2-      Reason for Visit:  On Radiation Treatment Visit     Treatment Summary to Date  Treatment Site: R breast + bst Current Dose: 4456/5656 cGy Fractions:       Chemotherapy  Chemo concurrent with radx?: No (Dr. Jackson)    Subjective:     Symptoms are stable    Nursing ROS:   Nutrition Alteration  Diet Type: Patient's Preference  Skin  Skin Reaction: 1 - Faint erythema or dry desquamation  Skin Intervention: Aquaphor 2-3 x day. Reviewed she may put in fridge and then apply.  Skin Note: Patient reports mild breast tenderness, discussed cool packs TID.        Cardiovascular  Respiratory effort: 1 - Normal - without distress        Psychosocial  Psychosocial Note: Patient reports increased fatigue  Pain Assessment  0-10 Pain Scale: 4  Pain Note: hip pain, osteoarthritis pain      Objective:   /79   Pulse 83   Temp 98.2  F (36.8  C) (Oral)   Resp 16   Wt 93 kg (205 lb)   SpO2 97%   BMI 35.39 kg/m    Gen: Appears well, in no acute distress  Skin: minimal diffuse erythema over treatment field    Labs:  CBC RESULTS: Recent Labs   Lab Test 23  1034   WBC 8.8   RBC 4.36   HGB 12.9   HCT 38.9   MCV 89   MCH 29.6   MCHC 33.2   RDW 16.4*        ELECTROLYTES:  Recent Labs   Lab Test 23  1034      POTASSIUM 3.8   CHLORIDE 105   IBETH 9.2   CO2 29   BUN 17   CR 0.97   *       Assessment:    Tolerating radiation therapy well.  All questions and concerns addressed.    Toxicities:  Fatigue: Grade 1: Fatigue relieved by rest  Pain: Grade 1: Mild pain  Dermatitis: Grade 1: Faint erythema or dry desquamation    Plan:   1. Continue current therapy.    2. Skin care per above.      Mosaiq chart and setup information  reviewed  Ports checked    Medication Review  Med list reviewed with patient?: Yes    Educational Topic Discussed  Additional Instructions: Follow up with Danisha French NP in 1 and 3.5 months, follow up with Dr. Jackson 7/18/23.  Education Instructions: Skin cares, fatigue        Vernell Hendrickson MD    Please do not send letter to referring physician.

## 2023-06-29 ENCOUNTER — APPOINTMENT (OUTPATIENT)
Dept: RADIATION ONCOLOGY | Facility: CLINIC | Age: 68
End: 2023-06-29
Payer: COMMERCIAL

## 2023-06-29 PROCEDURE — 77412 RADIATION TX DELIVERY LVL 3: CPT | Performed by: RADIOLOGY

## 2023-06-29 PROCEDURE — 77417 THER RADIOLOGY PORT IMAGE(S): CPT | Performed by: RADIOLOGY

## 2023-06-30 ENCOUNTER — APPOINTMENT (OUTPATIENT)
Dept: RADIATION ONCOLOGY | Facility: CLINIC | Age: 68
End: 2023-06-30
Payer: COMMERCIAL

## 2023-06-30 PROCEDURE — 77412 RADIATION TX DELIVERY LVL 3: CPT | Performed by: RADIOLOGY

## 2023-07-03 ENCOUNTER — APPOINTMENT (OUTPATIENT)
Dept: RADIATION ONCOLOGY | Facility: CLINIC | Age: 68
End: 2023-07-03
Payer: COMMERCIAL

## 2023-07-03 PROCEDURE — 77427 RADIATION TX MANAGEMENT X5: CPT | Performed by: RADIOLOGY

## 2023-07-03 PROCEDURE — 77412 RADIATION TX DELIVERY LVL 3: CPT | Performed by: RADIOLOGY

## 2023-07-03 PROCEDURE — 77336 RADIATION PHYSICS CONSULT: CPT | Performed by: RADIOLOGY

## 2023-07-05 ENCOUNTER — APPOINTMENT (OUTPATIENT)
Dept: RADIATION ONCOLOGY | Facility: CLINIC | Age: 68
End: 2023-07-05
Payer: COMMERCIAL

## 2023-07-05 ENCOUNTER — OFFICE VISIT (OUTPATIENT)
Dept: RADIATION ONCOLOGY | Facility: CLINIC | Age: 68
End: 2023-07-05
Payer: COMMERCIAL

## 2023-07-05 VITALS
DIASTOLIC BLOOD PRESSURE: 93 MMHG | HEART RATE: 78 BPM | SYSTOLIC BLOOD PRESSURE: 135 MMHG | WEIGHT: 212 LBS | TEMPERATURE: 98.8 F | OXYGEN SATURATION: 97 % | BODY MASS INDEX: 36.6 KG/M2

## 2023-07-05 DIAGNOSIS — C50.211 MALIGNANT NEOPLASM OF UPPER-INNER QUADRANT OF RIGHT FEMALE BREAST, UNSPECIFIED ESTROGEN RECEPTOR STATUS (H): Primary | ICD-10-CM

## 2023-07-05 PROBLEM — E66.01 CLASS 2 SEVERE OBESITY DUE TO EXCESS CALORIES WITH SERIOUS COMORBIDITY IN ADULT (H): Status: ACTIVE | Noted: 2023-07-05

## 2023-07-05 PROBLEM — E66.812 CLASS 2 SEVERE OBESITY DUE TO EXCESS CALORIES WITH SERIOUS COMORBIDITY IN ADULT (H): Status: ACTIVE | Noted: 2023-07-05

## 2023-07-05 PROCEDURE — 99207 PR DROP WITH A PROCEDURE: CPT | Performed by: SURGERY

## 2023-07-05 PROCEDURE — 99207 PR NO CHARGE LOS: CPT | Performed by: RADIOLOGY

## 2023-07-05 PROCEDURE — 77412 RADIATION TX DELIVERY LVL 3: CPT | Performed by: SURGERY

## 2023-07-05 ASSESSMENT — PAIN SCALES - GENERAL: PAINLEVEL: MILD PAIN (2)

## 2023-07-05 NOTE — PROGRESS NOTES
[facility] PHYSICIANS  SPECIALIZING IN BREAKTHROUGHS  Radiation Oncology    On Treatment Visit Note      Ginger Hernandez      Date: 2023   MRN: [id number]   : 1955  Diagnosis: R breast IDC ER/[place]+ HER2-        ID:  R breast IDC ER/NC+ HER2-    Reason for Visit:  On Radiation Treatment Visit       Treatment Summary to Date  Treatment Site: R breast + bst Current Dose: 5256/5656 cGy Fractions:            Subjective:   No complaints, tolerating RT well overall. Fatigue.      Objective:   BP (!) 135/93 (BP Location: Left arm, Cuff Size: Adult Regular)   Pulse 78   Temp 98.8  F (37.1  C)   Wt 96.2 kg (212 lb)   SpO2 97%   BMI 36.60 kg/m    Gen: Appears well, in no acute distress  Skin: Mild erythema, no skin breakdown  CV/Resp: rrr, breathing comfortably on room air  Neuro: CN 2-12 grossly intact, UE/LE full strength     Labs:  CBC RESULTS: Recent Labs   Lab Test 23  1034   WBC 8.8   RBC 4.36   HGB 12.9   HCT 38.9   MCV 89   MCH 29.6   MCHC 33.2   RDW 16.4*        ELECTROLYTES:  Recent Labs   Lab Test 23  1034      POTASSIUM 3.8   CHLORIDE 105   IBETH 9.2   CO2 29   BUN 17   CR 0.97   *       Assessment:    Tolerating radiation therapy well.  All questions and concerns addressed.      Plan:   1. Continue current therapy.        Mosaiq chart and setup information reviewed  Ports checked and MVCT/IGRT images checked              Cecil العراقي MD  Radiation Oncology   [facility]  Clinic: [phone]

## 2023-07-05 NOTE — PATIENT INSTRUCTIONS
Please contact [facility] RN with questions or concerns following today's appointment: [phone].       Please feel free to leave a detailed message if your call is not answered.    If your call is not received before 3:00 PM, it may not be returned until the following business day.    If you are receiving radiation treatment and need assistance after 3:00 PM or on the weekends, please call [phone] and ask to speak to the radiation oncologist on-call.    Thank you!

## 2023-07-05 NOTE — LETTER
2023         RE: Ginger Hernandez  [address] [facility] [address]        Dear Colleague,    Thank you for referring your patient, Ginger Hernandez, to the [facility] RADIATION ONCOLOGY [place]. Please see a copy of my visit note below.    [facility] PHYSICIANS  SPECIALIZING IN BREAKTHROUGHS  Radiation Oncology    On Treatment Visit Note      Ginger Hernandez      Date: 2023   MRN: [id number]   : 1955  Diagnosis: R breast IDC ER/[place]+ HER2-        ID:  R breast IDC ER/[place]+ HER2-    Reason for Visit:  On Radiation Treatment Visit       Treatment Summary to Date  Treatment Site: R breast + bst Current Dose: 5256/5656 cGy Fractions:            Subjective:   No complaints, tolerating RT well overall. Fatigue.      Objective:   BP (!) 135/93 (BP Location: Left arm, Cuff Size: Adult Regular)   Pulse 78   Temp 98.8  F (37.1  C)   Wt 96.2 kg (212 lb)   SpO2 97%   BMI 36.60 kg/m    Gen: Appears well, in no acute distress  Skin: Mild erythema, no skin breakdown  CV/Resp: rrr, breathing comfortably on room air  Neuro: CN 2-12 grossly intact, UE/LE full strength     Labs:  CBC RESULTS: Recent Labs   Lab Test 23  1034   WBC 8.8   RBC 4.36   HGB 12.9   HCT 38.9   MCV 89   MCH 29.6   MCHC 33.2   RDW 16.4*        ELECTROLYTES:  Recent Labs   Lab Test 23  1034      POTASSIUM 3.8   CHLORIDE 105   IBETH 9.2   CO2 29   BUN 17   CR 0.97   *       Assessment:    Tolerating radiation therapy well.  All questions and concerns addressed.      Plan:   1. Continue current therapy.        Mosaiq chart and setup information reviewed  Ports checked and MVCT/IGRT images checked              Cecil العراقي MD  Radiation Oncology   [facility]  Clinic: [phone]          Again, thank you for allowing me to participate in the care of your patient.        Sincerely,        Cecil العراقي MD

## 2023-07-07 ENCOUNTER — APPOINTMENT (OUTPATIENT)
Dept: RADIATION ONCOLOGY | Facility: CLINIC | Age: 68
End: 2023-07-07
Payer: COMMERCIAL

## 2023-07-07 PROCEDURE — 77412 RADIATION TX DELIVERY LVL 3: CPT | Performed by: SURGERY

## 2023-07-12 ENCOUNTER — DOCUMENTATION ONLY (OUTPATIENT)
Dept: RADIATION ONCOLOGY | Facility: CLINIC | Age: 68
End: 2023-07-12
Payer: COMMERCIAL

## 2023-07-12 DIAGNOSIS — C50.211 MALIGNANT NEOPLASM OF UPPER-INNER QUADRANT OF RIGHT FEMALE BREAST, UNSPECIFIED ESTROGEN RECEPTOR STATUS (H): Primary | ICD-10-CM

## 2023-07-12 PROCEDURE — 99207 PR NO BILLABLE SERVICE THIS VISIT: CPT | Performed by: RADIOLOGY

## 2023-07-12 NOTE — PROGRESS NOTES
Radiotherapy Treatment Summary              PATIENT: Ginger Hernandez  MEDICAL RECORD NO: [id number]   : 1955    PATHOLOGY/STAGE: 68 year old woman with right (UIQ) breast cancer, status post lumpectomy and SLNBx, revealing G3 IDCA w/ G3 DCIS, sT5M2M0 ER+/NJ+/H2N- disease excised with a close noninvasive margin, followed by TC x4 completed on 23 s/p adjuvant radiation therapy completed on 23.     INTENT OF RADIOTHERAPY: Curative    CONCURRENT SYSTEMIC THERAPY:  None         SITE OF TREATMENT:  Right Breast    DATES  OF TREATMENT: 2023 to 2023    TOTAL DOSE OF TREATMENT: 4,256 cGy to the right breast with 1200 cGy of planned 1400 cGy tumor bed boost    DOSE PER FRACTION OF TREATMENT: 266 cGy to the right breast and 200 cGy to the tumor bed       COMMENT/TOXICITY:  Grade 1 skin reaction managed with moisturizer and mild fatigue relieved by rest. Patient reported mild breast tenderness, discussed cool packs TID. Patient was also intermittently despondent given social situation and other medical comorbidities which include pain from her hips and knees requiring a cane/walker.  Previously she was unable to have surgical correction for her lower extremities and had to stop working. Emotional support was provided and pt politely declined psych services. During her last week of treatment on 23 she informed us she did not want to return on 7/10/23 to complete the last treatment. This is reasonable as the final dose to the tumor bed is still within guidelines of a therapeutic dose for a close margin.      PAIN MANAGEMENT:  Patient did not require any pain medications.  Recommended cool packs TID for discomfort.                          FOLLOW UP PLAN:  Patient will follow up with Danisha French NP in 1 month and in 3.5 months.  Patient will continue close follow up with medical oncology.     Vernell Hendrickson MD  Department of Radiation Oncology  [facility]     CC  Patient Care  Team:  No Ref-Primary, Physician as PCP - General  Fartun Jackson MD as MD (Medical Oncology)  Cecil العراقي MD as MD (Radiation Oncology)  Jennifer Cole RN as Specialty Care Coordinator (Hematology & Oncology)  Shameka Hendrickson MD as MD (Radiation Oncology)  Fartun Jackson MD as MD (Medical Oncology)  Shameka Hendrickson MD as Assigned Cancer Care Provider

## 2023-07-13 ENCOUNTER — TELEPHONE (OUTPATIENT)
Dept: RADIATION ONCOLOGY | Facility: CLINIC | Age: 68
End: 2023-07-13
Payer: COMMERCIAL

## 2023-07-13 NOTE — TELEPHONE ENCOUNTER
I called to schedule patient for her 1 and 3.5 month video with with our NP Danisha and patient didn't want to scheduled either of those and said she will reach out to Radiation if she has any issues.

## 2023-07-18 ENCOUNTER — ONCOLOGY VISIT (OUTPATIENT)
Dept: ONCOLOGY | Facility: CLINIC | Age: 68
End: 2023-07-18
Attending: INTERNAL MEDICINE
Payer: COMMERCIAL

## 2023-07-18 VITALS
OXYGEN SATURATION: 97 % | SYSTOLIC BLOOD PRESSURE: 147 MMHG | HEIGHT: 64 IN | WEIGHT: 206 LBS | DIASTOLIC BLOOD PRESSURE: 89 MMHG | HEART RATE: 92 BPM | BODY MASS INDEX: 35.17 KG/M2

## 2023-07-18 DIAGNOSIS — Z17.0 MALIGNANT NEOPLASM OF AREOLA OF RIGHT BREAST IN FEMALE, ESTROGEN RECEPTOR POSITIVE (H): Primary | ICD-10-CM

## 2023-07-18 DIAGNOSIS — C50.011 MALIGNANT NEOPLASM OF AREOLA OF RIGHT BREAST IN FEMALE, ESTROGEN RECEPTOR POSITIVE (H): Primary | ICD-10-CM

## 2023-07-18 PROCEDURE — 99213 OFFICE O/P EST LOW 20 MIN: CPT | Performed by: INTERNAL MEDICINE

## 2023-07-18 RX ORDER — ANASTROZOLE 1 MG/1
1 TABLET ORAL DAILY
Qty: 90 TABLET | Refills: 3 | Status: SHIPPED | OUTPATIENT
Start: 2023-07-18

## 2023-07-18 ASSESSMENT — PAIN SCALES - GENERAL: PAINLEVEL: NO PAIN (0)

## 2023-07-18 NOTE — LETTER
7/18/2023         RE: Ginger Hernandez  [address] [facility] [address]        Dear Colleague,    Thank you for referring your patient, Ginger Hernandez, to the [facility]. Please see a copy of my visit note below.    [facility]  MEDICAL ONCOLOGY    RETURN PATIENT VISIT NOTE    Reason for visit: Breast cancer    Oncology Treatment Summary  1. 11/18/22 DEXA normal  2. 11/18/22 screening mammogram with Right sided mass 2.4 cm  3. 12/2/22 US with 2.3 cm mass and upper limit of normal LN  4. 12/12/22 bx with IDC, ER+LA+ Her2 2+ FISH negative  5. 1/17/23 RIGHT lumpectomy G3 IDC, 3.3 cm 0/1 SLN, ER90%+ LA 20%+ [id number] negative via FISH  6. Oncotype recurrence score 36, high risk  7. 4 cycles adjuvant taxotere cytoxan completed 5/2/23  8. Adjuvant radiation 6/6/23-7/7/23 4256cGy     HISTORY OF PRESENTING ILLNESS  Pleasant 67 year old seen today for followup. She completed radiation a couple of weeks ago and is recovering. She notes some edema of the breast. She has no other new issues or problems. No n/v/d/c. No GI symptoms. No cough or SOB. She is well.  She does have concerns about taking any antiestrogen therapy.     PAST MEDICAL HISTORY  Hepatitis C, migraines, HTN, GERD, lumbar fusion, arthritis.      CURRENT OUTPATIENT MEDICATIONS  Current Outpatient Medications   Medication     celecoxib (CELEBREX) 200 MG capsule     Cholecalciferol 250 MCG (97688 UT) CAPS     clobetasol (TEMOVATE) 0.05 % external ointment     cycloSPORINE (RESTASIS) 0.05 % ophthalmic emulsion     desoximetasone (TOPICORT) 0.25 % external cream     eletriptan (RELPAX) 40 MG tablet     esomeprazole (NEXIUM) 20 MG DR capsule     fexofenadine (ALLEGRA) 180 MG tablet     fluticasone (FLONASE) 50 MCG/ACT nasal spray     ibuprofen (ADVIL/MOTRIN) 200 MG tablet     ketoconazole (NIZORAL) 2 % external shampoo     lisinopril-hydrochlorothiazide (ZESTORETIC) 10-12.5 MG tablet     magic  "mouthwash suspension (diphenhydrAMINE, lidocaine, aluminum-magnesium & simethicone)     magnesium 200 MG TABS     metroNIDAZOLE (METROGEL) 1 % external gel     Misc Natural Products (LUTEIN 20 PO)     omeprazole (PRILOSEC) 20 MG DR capsule     oxyCODONE IR (ROXICODONE) 15 MG tablet     penciclovir (DENAVIR) 1 % external cream     rizatriptan (MAXALT) 10 MG tablet     SUMAtriptan (IMITREX) 20 MG/ACT nasal spray     valACYclovir (VALTREX) 1000 mg tablet     zolpidem (AMBIEN) 10 MG tablet     No current facility-administered medications for this visit.        ALLERGIES     Allergies   Allergen Reactions     Contrast Dye Anaphylaxis     Iodinated Contrast Media Anaphylaxis and Hives     Other reaction(s): Unknown  HUT Comment: xray dye; HUT Reaction: Anaphylaxis; HUT Reaction: Hives; HUT Severity: High; HUT Noted: 20110301  xray dye  xray dye       Ketamine Other (See Comments)     Other reaction(s): Emotional Disturbance, Unknown  \"complete, emotional reaction\"; HUT Comment: \"complete, emotional reaction\"\"complete, emotional reaction\"; HUT Reaction: Emotional Disturbance; HUT Noted: 20180525  \"complete, emotional reaction\"  \"complete, emotional reaction\"  \"complete, emotional reaction\"  Other reaction(s): Emotional Disturbance  \"complete, emotional reaction\"  \"complete, emotional reaction\"       Nabumetone      Other reaction(s): Gastrointestinal, GI Upset, Unknown  gerd; HUT Comment: gerd; HUT Reaction: Gastrointestinal; HUT Noted: 20130123  gerd  Other reaction(s): Gastrointestinal, GI Upset  gerd  gerd       Pregabalin      Other reaction(s): confusion, Confusion, Confusion, Mental Status Change  HUT Reaction: Confusion; HUT Reaction: Confusion; HUT Noted: 20120315  Other reaction(s): Confusion, Mental Status Change          SOCIAL HISTORY  Lives in [place], single, retired, no tobacco, rare etoh, no children.      FAMILY HISTORY  Mother with breast cancer in late 60s, MGM breast cancer in 60s, she was told by a " 2nd cousin that they have a brca mutation and was given a piece of paper about it some years ago but has not followed up on it.     REVIEW OF SYSTEMS  Review Of Systems  Skin: negative  Eyes: negative  Ears/Nose/Throat: negative  Respiratory: No shortness of breath, dyspnea on exertion, cough, or hemoptysis  Cardiovascular: negative  Gastrointestinal: negative  Genitourinary: negative  Musculoskeletal: negative  Neurologic: negative  Psychiatric: negative  Hematologic/Lymphatic/Immunologic: negative  Endocrine: negative      PHYSICAL EXAM  B/P: Data Unavailable, T: Data Unavailable, P: Data Unavailable, R: Data Unavailable  Wt Readings from Last 3 Encounters:   07/05/23 96.2 kg (212 lb)   06/28/23 93 kg (205 lb)   06/21/23 93.4 kg (206 lb)       ECOG PPS0    Physical Exam  Vitals reviewed.   Constitutional:       Appearance: Normal appearance.   HENT:      Head: Normocephalic and atraumatic.   Eyes:      Extraocular Movements: Extraocular movements intact.      Conjunctiva/sclera: Conjunctivae normal.      Pupils: Pupils are equal, round, and reactive to light.   Neurological:      General: No focal deficit present.      Mental Status: She is alert and oriented to person, place, and time. Mental status is at baseline.   Psychiatric:         Behavior: Behavior normal.        Recent Labs   Lab Test 05/02/23  1034 04/11/23  1315 03/22/23  1043 03/02/23  1223 02/24/23  1634    135 133 137 139   POTASSIUM 3.8 4.2 3.8 4.0 4.1   CHLORIDE 105 105 103 106 110*   CO2 29 25 24 25 26   ANIONGAP 5 5 6 6 3   BUN 17 15 20 15 15   CR 0.97 0.88 0.84 0.81 0.76   * 110* 231* 188* 148*   IBETH 9.2 9.2 9.3 9.6 9.3     No results for input(s): MAG, PHOS in the last 05177 hours.  Recent Labs   Lab Test 05/02/23  1034 04/11/23  1315 03/22/23  1043 03/02/23  1223 02/24/23  1634   WBC 8.8 11.5* 8.3 10.1 9.4   HGB 12.9 14.6 15.3 16.6* 15.7    299 309 282 254   MCV 89 87 87 87 87   NEUTROPHIL 61 61 67 50 52     Recent Labs    Lab Test 05/02/23  1034 04/11/23  1315 03/22/23  1043   BILITOTAL 0.3 0.4 0.8   ALKPHOS 60 57 66   ALT 44 56* 70*   AST 19 24 23   ALBUMIN 3.9 4.2 4.0     No results found for: TSH  No results for input(s): CEA in the last 75142 hours.  Results for orders placed or performed in visit on 01/17/23   [facility]    Narrative    Images were obtained from an external facility.  Click PACS Images   hyperlink to view images.  Textual results have been scanned into the   media tab.        ASSESSMENT  AND RECOMMENDATIONS:    1. T2N0M0 ER+CT+Her2 negative IDC s/p RIGHT lumpectomy, high risk oncotype score 36.   2. Family hx of breast cancer with vague question of a brca mutation in family.      Plan    1.she is doing well  2. She is not certain she wants to take antiestrogen therapy however after discussion given her high risk oncotype and the survival advantage associated with taking this she is willing to try. She was given an Rx for arimidex and will start. RTC 3 months for exam and side effect check.   Fartun Jackson MD on 7/18/2023 at 5:17 PM              Again, thank you for allowing me to participate in the care of your patient.        Sincerely,        Fartun Jackson MD

## 2023-07-18 NOTE — PROGRESS NOTES
[facility]  MEDICAL ONCOLOGY    RETURN PATIENT VISIT NOTE    Reason for visit: Breast cancer    Oncology Treatment Summary  1. 11/18/22 DEXA normal  2. 11/18/22 screening mammogram with Right sided mass 2.4 cm  3. 12/2/22 US with 2.3 cm mass and upper limit of normal LN  4. 12/12/22 bx with IDC, ER+KS+ Her2 2+ FISH negative  5. 1/17/23 RIGHT lumpectomy G3 IDC, 3.3 cm 0/1 SLN, ER90%+ KS 20%+ [id number] negative via FISH  6. Oncotype recurrence score 36, high risk  7. 4 cycles adjuvant taxotere cytoxan completed 5/2/23  8. Adjuvant radiation 6/6/23-7/7/23 4256cGy     HISTORY OF PRESENTING ILLNESS  Pleasant 67 year old seen today for followup. She completed radiation a couple of weeks ago and is recovering. She notes some edema of the breast. She has no other new issues or problems. No n/v/d/c. No GI symptoms. No cough or SOB. She is well.  She does have concerns about taking any antiestrogen therapy.     PAST MEDICAL HISTORY  Hepatitis C, migraines, HTN, GERD, lumbar fusion, arthritis.      CURRENT OUTPATIENT MEDICATIONS  Current Outpatient Medications   Medication     celecoxib (CELEBREX) 200 MG capsule     Cholecalciferol 250 MCG (28176 UT) CAPS     clobetasol (TEMOVATE) 0.05 % external ointment     cycloSPORINE (RESTASIS) 0.05 % ophthalmic emulsion     desoximetasone (TOPICORT) 0.25 % external cream     eletriptan (RELPAX) 40 MG tablet     esomeprazole (NEXIUM) 20 MG DR capsule     fexofenadine (ALLEGRA) 180 MG tablet     fluticasone (FLONASE) 50 MCG/ACT nasal spray     ibuprofen (ADVIL/MOTRIN) 200 MG tablet     ketoconazole (NIZORAL) 2 % external shampoo     lisinopril-hydrochlorothiazide (ZESTORETIC) 10-12.5 MG tablet     magic mouthwash suspension (diphenhydrAMINE, lidocaine, aluminum-magnesium & simethicone)     magnesium 200 MG TABS     metroNIDAZOLE (METROGEL) 1 % external gel     Misc Natural Products (LUTEIN 20 PO)     omeprazole (PRILOSEC) 20 MG DR capsule     oxyCODONE IR (ROXICODONE)  "15 MG tablet     penciclovir (DENAVIR) 1 % external cream     rizatriptan (MAXALT) 10 MG tablet     SUMAtriptan (IMITREX) 20 MG/ACT nasal spray     valACYclovir (VALTREX) 1000 mg tablet     zolpidem (AMBIEN) 10 MG tablet     No current facility-administered medications for this visit.        ALLERGIES     Allergies   Allergen Reactions     Contrast Dye Anaphylaxis     Iodinated Contrast Media Anaphylaxis and Hives     Other reaction(s): Unknown  HUT Comment: xray dye; HUT Reaction: Anaphylaxis; HUT Reaction: Hives; HUT Severity: High; HUT Noted: 20110301  xray dye  xray dye       Ketamine Other (See Comments)     Other reaction(s): Emotional Disturbance, Unknown  \"complete, emotional reaction\"; HUT Comment: \"complete, emotional reaction\"\"complete, emotional reaction\"; HUT Reaction: Emotional Disturbance; HUT Noted: 20180525  \"complete, emotional reaction\"  \"complete, emotional reaction\"  \"complete, emotional reaction\"  Other reaction(s): Emotional Disturbance  \"complete, emotional reaction\"  \"complete, emotional reaction\"       Nabumetone      Other reaction(s): Gastrointestinal, GI Upset, Unknown  gerd; HUT Comment: gerd; HUT Reaction: Gastrointestinal; HUT Noted: 20130123  gerd  Other reaction(s): Gastrointestinal, GI Upset  gerd  gerd       Pregabalin      Other reaction(s): confusion, Confusion, Confusion, Mental Status Change  HUT Reaction: Confusion; HUT Reaction: Confusion; HUT Noted: 20120315  Other reaction(s): Confusion, Mental Status Change          SOCIAL HISTORY  Lives in [place], single, retired, no tobacco, rare etoh, no children.      FAMILY HISTORY  Mother with breast cancer in late 60s, MGM breast cancer in 60s, she was told by a 2nd cousin that they have a brca mutation and was given a piece of paper about it some years ago but has not followed up on it.     REVIEW OF SYSTEMS  Review Of Systems  Skin: negative  Eyes: negative  Ears/Nose/Throat: negative  Respiratory: No shortness of breath, " dyspnea on exertion, cough, or hemoptysis  Cardiovascular: negative  Gastrointestinal: negative  Genitourinary: negative  Musculoskeletal: negative  Neurologic: negative  Psychiatric: negative  Hematologic/Lymphatic/Immunologic: negative  Endocrine: negative      PHYSICAL EXAM  B/P: Data Unavailable, T: Data Unavailable, P: Data Unavailable, R: Data Unavailable  Wt Readings from Last 3 Encounters:   07/05/23 96.2 kg (212 lb)   06/28/23 93 kg (205 lb)   06/21/23 93.4 kg (206 lb)       ECOG PPS0    Physical Exam  Vitals reviewed.   Constitutional:       Appearance: Normal appearance.   HENT:      Head: Normocephalic and atraumatic.   Eyes:      Extraocular Movements: Extraocular movements intact.      Conjunctiva/sclera: Conjunctivae normal.      Pupils: Pupils are equal, round, and reactive to light.   Neurological:      General: No focal deficit present.      Mental Status: She is alert and oriented to person, place, and time. Mental status is at baseline.   Psychiatric:         Behavior: Behavior normal.        Recent Labs   Lab Test 05/02/23  1034 04/11/23  1315 03/22/23  1043 03/02/23  1223 02/24/23  1634    135 133 137 139   POTASSIUM 3.8 4.2 3.8 4.0 4.1   CHLORIDE 105 105 103 106 110*   CO2 29 25 24 25 26   ANIONGAP 5 5 6 6 3   BUN 17 15 20 15 15   CR 0.97 0.88 0.84 0.81 0.76   * 110* 231* 188* 148*   IBETH 9.2 9.2 9.3 9.6 9.3     No results for input(s): MAG, PHOS in the last 62626 hours.  Recent Labs   Lab Test 05/02/23  1034 04/11/23  1315 03/22/23  1043 03/02/23  1223 02/24/23  1634   WBC 8.8 11.5* 8.3 10.1 9.4   HGB 12.9 14.6 15.3 16.6* 15.7    299 309 282 254   MCV 89 87 87 87 87   NEUTROPHIL 61 61 67 50 52     Recent Labs   Lab Test 05/02/23  1034 04/11/23  1315 03/22/23  1043   BILITOTAL 0.3 0.4 0.8   ALKPHOS 60 57 66   ALT 44 56* 70*   AST 19 24 23   ALBUMIN 3.9 4.2 4.0     No results found for: TSH  No results for input(s): CEA in the last 05493 hours.  Results for orders placed or  performed in visit on 01/17/23   [facility]    Narrative    Images were obtained from an external facility.  Click PACS Images   hyperlink to view images.  Textual results have been scanned into the   media tab.        ASSESSMENT  AND RECOMMENDATIONS:    1. T2N0M0 ER+OR+Her2 negative IDC s/p RIGHT lumpectomy, high risk oncotype score 36.   2. Family hx of breast cancer with vague question of a brca mutation in family.      Plan    1.she is doing well  2. She is not certain she wants to take antiestrogen therapy however after discussion given her high risk oncotype and the survival advantage associated with taking this she is willing to try. She was given an Rx for arimidex and will start. RTC 3 months for exam and side effect check.   Fartun Jackson MD on 7/18/2023 at 5:17 PM

## 2023-07-18 NOTE — NURSING NOTE
"Oncology Rooming Note    July 18, 2023 4:42 PM   Ginger Hernandez is a 68 year old female who presents for:    Chief Complaint   Patient presents with     Oncology Clinic Visit     Follow up     Initial Vitals: BP (!) 147/89 (BP Location: Left arm, Patient Position: Chair, Cuff Size: Adult Large)   Pulse 92   Ht 1.621 m (5' 3.82\")   Wt 93.4 kg (206 lb)   SpO2 97%   BMI 35.56 kg/m   Estimated body mass index is 35.56 kg/m  as calculated from the following:    Height as of this encounter: 1.621 m (5' 3.82\").    Weight as of this encounter: 93.4 kg (206 lb). Body surface area is 2.05 meters squared.  No Pain (0) Comment: Data Unavailable   No LMP recorded. Patient is postmenopausal.  Allergies reviewed: Yes  Medications reviewed: Yes    Medications: Medication refills not needed today.  Pharmacy name entered into [facility]: [facility] - [place] [id number]    Clinical concerns: NO Dr. Jackson was notified.      Krysta Estes CMA              "

## 2024-03-24 ENCOUNTER — HEALTH MAINTENANCE LETTER (OUTPATIENT)
Age: 69
End: 2024-03-24

## 2024-06-02 ENCOUNTER — HEALTH MAINTENANCE LETTER (OUTPATIENT)
Age: 69
End: 2024-06-02

## 2024-06-27 ENCOUNTER — PATIENT OUTREACH (OUTPATIENT)
Dept: ONCOLOGY | Facility: CLINIC | Age: 69
End: 2024-06-27
Payer: COMMERCIAL

## 2024-06-27 NOTE — PROGRESS NOTES
[facility]: Cancer Care                                                                                          Return call made to patient regarding Arimidex. Patient states she stopped the Arimidex about 2 months ago due to the following side effects: increased migraines, hot flashes, bone pain and hair loss.  The side effects slowly improved after stopping the Arimidex. Scheduled an appointment with Carissa COFFMAN to discuss other Aromatase Inhibitor options.    Signature:  Jennifer Cole RN

## 2024-07-18 ENCOUNTER — VIRTUAL VISIT (OUTPATIENT)
Dept: ONCOLOGY | Facility: CLINIC | Age: 69
End: 2024-07-18
Attending: NURSE PRACTITIONER
Payer: COMMERCIAL

## 2024-07-18 DIAGNOSIS — Z80.3 FAMILY HISTORY OF MALIGNANT NEOPLASM OF BREAST: ICD-10-CM

## 2024-07-18 DIAGNOSIS — Z17.0 MALIGNANT NEOPLASM OF AREOLA OF RIGHT BREAST IN FEMALE, ESTROGEN RECEPTOR POSITIVE (H): Primary | ICD-10-CM

## 2024-07-18 DIAGNOSIS — C50.011 MALIGNANT NEOPLASM OF AREOLA OF RIGHT BREAST IN FEMALE, ESTROGEN RECEPTOR POSITIVE (H): Primary | ICD-10-CM

## 2024-07-18 PROCEDURE — 99215 OFFICE O/P EST HI 40 MIN: CPT | Mod: 95 | Performed by: NURSE PRACTITIONER

## 2024-07-18 NOTE — LETTER
7/18/2024      Ginger Hernandez  [address] [facility] [address]      Dear Colleague,    Thank you for referring your patient, Ginger Hernandez, to the [facility]. Please see a copy of my visit note below.    Virtual Visit Details    Type of service:  Video Visit   Video Start Time:  3:49 PM  Video End Time: 4:31 PM    Originating Location (pt. Location): Home    Distant Location (provider location):  On-site  Platform used for Video Visit: [facility]    Oncology Follow Up Visit: July 18, 2024     Oncologist: Dr Fartun Jackson  PCP:  [facility]    Diagnosis: Stage 2A Right Breast Cancer (T2M0N0)  Ginger Hernandez is a 70 yo female who had a screening mammogram on 11/18/2022 which noted a Right sided mass 2.4 cm  12/2/22 US with 2.3 cm mass and upper limit of normal LN  12/12/22 bx with IDC, ER+CO+ Her2 2+ FISH negative  1/17/23 RIGHT lumpectomy G3 IDC, 3.3 cm 0/1 SLN, ER90%+ CO 20%+ [id number] negative via FISH  Oncotype recurrence score 36, high risk  Treatment:   1/17/2022 Right lumpectomy with SLN biopsy  3/2/2023 -5/2/2023 Taxotere/Cytoxan x 4  6/6/23-7/7/23 Adjuvant radiation 4256cGy  7/2023 - 4/2024 Anastrozole- added up to 2-3 months of time - quit due to side effects- achy and thin hair.    Interval History: Ms Hernandez is seen by video for review of breast hormone treatment. Stopped Arimidex due to side effects now months ago and she is feeling better and hair is mildly improved and vaginal dryness improved. Questioning if she wants to trial any further use of hormone therapy- just not feeling it is worth living through side effects   Questioning if genetics needed- no children but has mother and grand mother and others in family with breast cancer only- wants to base hormone therapy on any genetics she may have that increases risk.    3 pm mammogram tomorrow at breast center at .   Admits to some discomfort to lumpectomy area at times. No skin  changes, abdominal pain, weight changes or increased fatigue.   Rest of comprehensive and complete ROS is reviewed and is negative.   Past Medical History:   Diagnosis Date     Acne rosacea 2/7/2023     Breast cancer (H) 12/12/2022    Right Breast     CTS (carpal tunnel syndrome) 10/9/2014     Depression 2/7/2023     Estrogen receptor positive neoplasm 2/7/2023     GERD (gastroesophageal reflux disease) 3/4/2016     Hypertension 3/3/2011     Keratoconus 2/7/2023     Lumbar pseudoarthrosis 2/7/2023     Macular degeneration (senile) of retina 4/11/2018     Malignant neoplasm of areola of right breast in female, estrogen receptor positive (H) 2/24/2023     Malignant neoplasm of upper-inner quadrant of female breast (H) 2/7/2023     Migraine headache 6/7/2003    Formatting of this note might be different from the original. Migraine Without Aura     Neuropathy 2/7/2023    Formatting of this note might be different from the original. from hep c tx, involving hands and feet Formatting of this note might be different from the original. from hep c tx, involving hands and feet     Nuclear sclerotic cataract 2/7/2023     Posterior tibial tendon dysfunction 5/19/2015    Formatting of this note might be different from the original. Posterior tibial tendon dysfunction -- Surg 5/19/15     RLS (restless legs syndrome) 2/7/2023     S/P lumbar fusion 2/1/2018     Trigger finger, acquired 10/9/2014    Formatting of this note might be different from the original. Trigger finger (acquired)     Current Outpatient Medications   Medication Sig Dispense Refill     anastrozole (ARIMIDEX) 1 MG tablet Take 1 tablet (1 mg) by mouth daily 90 tablet 3     Cholecalciferol 250 MCG (16827 UT) CAPS        clobetasol (TEMOVATE) 0.05 % external ointment APPLY TO HANDS TWICE A DAY AS NEEDED FOR UP TO TWO WEEKS AT A TIME (Patient not taking: Reported on 7/18/2023)       cycloSPORINE (RESTASIS) 0.05 % ophthalmic emulsion Place 1 drop into both eyes 2  "times daily       desoximetasone (TOPICORT) 0.25 % external cream        esomeprazole (NEXIUM) 20 MG DR capsule 1 capsule       fexofenadine (ALLEGRA) 180 MG tablet Take 180 mg by mouth       fluticasone (FLONASE) 50 MCG/ACT nasal spray Spray 2 sprays in nostril       ibuprofen (ADVIL/MOTRIN) 200 MG tablet 1 tablet with food or milk as needed       ketoconazole (NIZORAL) 2 % external shampoo Use to wash scalp daily for flares and 1-2 times weekly for maintenance, leave on for 5 minutes then rinse.       lisinopril-hydrochlorothiazide (ZESTORETIC) 10-12.5 MG tablet Take 1 tablet by mouth       magnesium 200 MG TABS        metroNIDAZOLE (METROGEL) 1 % external gel 1 application       Misc Natural Products (LUTEIN 20 PO)        oxyCODONE IR (ROXICODONE) 15 MG tablet Take 15-30 mg by mouth every 6 hours as needed for pain       SUMAtriptan (IMITREX) 20 MG/ACT nasal spray        valACYclovir (VALTREX) 1000 mg tablet Take 1 tablet by mouth       zolpidem (AMBIEN) 10 MG tablet Take 10 mg by mouth       No current facility-administered medications for this visit.     Allergies   Allergen Reactions     Contrast Dye Anaphylaxis     Iodinated Contrast Media Anaphylaxis and Hives     Other reaction(s): Unknown  HUT Comment: xray dye; HUT Reaction: Anaphylaxis; HUT Reaction: Hives; HUT Severity: High; HUT Noted: 20110301  xray dye  xray dye       Ketamine Other (See Comments)     Other reaction(s): Emotional Disturbance, Unknown  \"complete, emotional reaction\"; HUT Comment: \"complete, emotional reaction\"\"complete, emotional reaction\"; HUT Reaction: Emotional Disturbance; HUT Noted: 20180525  \"complete, emotional reaction\"  \"complete, emotional reaction\"  \"complete, emotional reaction\"  Other reaction(s): Emotional Disturbance  \"complete, emotional reaction\"  \"complete, emotional reaction\"       Nabumetone      Other reaction(s): Gastrointestinal, GI Upset, Unknown  gerd; HUT Comment: gerd; HUT Reaction: Gastrointestinal; HUT " Noted: [id number]  gerd  Other reaction(s): Gastrointestinal, GI Upset  gerd  gerd       Pregabalin      Other reaction(s): confusion, Confusion, Confusion, Mental Status Change  HUT Reaction: Confusion; HUT Reaction: Confusion; HUT Noted: 20120315  Other reaction(s): Confusion, Mental Status Change     - single,    Physical Exam:There were no vitals taken for this visit.   GENERAL: alert and no distress  EYES: Eyes grossly normal to inspection.  No discharge or erythema, or obvious scleral/conjunctival abnormalities.  RESP: No audible wheeze, cough, or visible cyanosis.    SKIN: Visible skin clear. No significant rash, abnormal pigmentation or lesions.Noting hair to be growing- cannot evaluate thickness per video.   NEURO: Cranial nerves grossly intact.  Mentation and speech appropriate for age.  PSYCH: Appropriate affect, tone, with fast talking noted.  The rest of a comprehensive physical examination is deferred due to video visit restrictions     Laboratory/Imaging Results:   No results found for any visits on 07/18/24.    Assessment and Plan:   Stage 2a Right Breast Cancer- Pt has completed Right lumpectomy with SLN biopsy  Then went on to add Taxotere/Cytoxan x 4 due to high OncotypeDX  and completed radiation therapy prior to start of Anastrozole in 7/2023. Pt admits she has used the AI off and on adding up to 2-3 months of the drug since did not like side effects of hair thinning and aches. .  Had long discussion of benefit of using hormone therapy with stage 2a breast cancer- AI vs. Tamoxifen and advantage or disadvantage of intermittent use.   Pt overall has decided that she will finish up use of current supply of anastrozole and quit all hormone blocking for now.   - is interested in genetic counseling due to personal and family history of breast cancers and wants to see genetic counselor though has no daughters. States she has completed a previous screen prior to surgery through [facility]  but never  got to results.   - pt will have bilateral screening mammogram tomorrow at [facility]- educated she will need annually.   - She will return in 6 months for review and clinical breast exam.   Bone health- DEXA on 11/18/2022 showed normal bone density. Encouraged calcium supplement but states she is a good eater and uses cheese- does have Vitamin D supplement daily. Also shared need for exercise to help with weight bearing encouragement for bone development.    The total time of this encounter amounted to 50 minutes. This time included video time spent with the patient, prep work, ordering tests, and performing post visit documentation.  Carissa Padilla Cnp      Again, thank you for allowing me to participate in the care of your patient.        Sincerely,        Carissa Padilla, CASEY, APRN CNP

## 2024-07-18 NOTE — NURSING NOTE
Current patient location: [id number] [place] [address] [address]VE [facility] [address]    Is the patient currently in the state of [place]? YES    Visit mode:VIDEO    If the visit is dropped, the patient can be reconnected by: VIDEO VISIT: Text to cell phone:   Telephone Information:   Mobile [phone]       Will anyone else be joining the visit? NO  (If patient encounters technical issues they should call [phone]    How would you like to obtain your AVS? MyChart    Are changes needed to the allergy or medication list? Pt stated no changes to allergies and Pt stated no med changes    Are refills needed on medications prescribed by this physician? NO    Reason for visit: RECHECK    Yoan MATHEW

## 2024-07-19 ENCOUNTER — PATIENT OUTREACH (OUTPATIENT)
Dept: ONCOLOGY | Facility: CLINIC | Age: 69
End: 2024-07-19
Payer: COMMERCIAL

## 2024-07-19 NOTE — PROGRESS NOTES
Writer received referral to Cancer Risk Management/Genetic Counseling.    Referred for: Genetic Counseling; personal and family history of breast cancer     Patient did meet with Genetic Counselor at [facility] but did not have testing done. Please see notes dated 3/2/2023 from Daniella Clark and 3/22/23 from Fartun Han for more information on visit with GC.    Referred by:   Provider Department Location Phone   Carissa Padilla, JOVANNY CNP  [facility] [phone]     Referral reviewed for appropriate plan and sent to New Patient Scheduling ([phone]) for completion.

## 2024-07-30 ENCOUNTER — VIRTUAL VISIT (OUTPATIENT)
Dept: ONCOLOGY | Facility: CLINIC | Age: 69
End: 2024-07-30
Attending: NURSE PRACTITIONER
Payer: COMMERCIAL

## 2024-07-30 DIAGNOSIS — Z17.0 MALIGNANT NEOPLASM OF AREOLA OF RIGHT BREAST IN FEMALE, ESTROGEN RECEPTOR POSITIVE (H): Primary | ICD-10-CM

## 2024-07-30 DIAGNOSIS — Z80.3 FAMILY HISTORY OF MALIGNANT NEOPLASM OF BREAST: ICD-10-CM

## 2024-07-30 DIAGNOSIS — C50.011 MALIGNANT NEOPLASM OF AREOLA OF RIGHT BREAST IN FEMALE, ESTROGEN RECEPTOR POSITIVE (H): Primary | ICD-10-CM

## 2024-07-30 PROCEDURE — 96040 HC GENETIC COUNSELING, EACH 30 MINUTES: CPT | Mod: TEL,95 | Performed by: GENETIC COUNSELOR, MS

## 2024-07-30 NOTE — LETTER
"7/30/2024      Ginger Hernandez  [address] [facility] [address]      Dear Colleague,    Thank you for referring your patient, Ginger Hernandez, to the [facility]. Please see a copy of my visit note below.    Virtual Visit Details    Type of service:  Telephone Visit   Phone call duration: 69 minutes   Originating Location (pt. Location): Home  Distant Location (provider location):  Off-site    7/30/2024    Referring Provider: Carissa Padilla APRN CNP    Presenting Information:   I spoke with Ginger Hernandez over the phone today for genetic counseling to discuss her personal and family history of cancer. This appointment was conducted virtually due to COVID-19 precautions. We talked today to review this history, cancer screening recommendations, and available genetic testing options.    Personal History:  Ginger is a 69 year old female. She was diagnosed with a right breast cancer at age 67 in December 2022 (IDC, ER positive, [place] positive, HER2 negative). She underwent right lumpectomy on 1/17/23, followed by chemotherapy, radiation, and some anastrozole.     She was previously seen for genetic counseling on 1/9/23 at [facility]. At that visit she elected to pursue genetic testing for the \"custom cancer gene panel evaluating the breast, gyn and common cancer genes\". This testing was to be completed via a saliva kit sent to her from [employer]. Ginger did not end up going through with the genetic testing at that time. She reported today that she did not have a good experience at that previous genetic counseling visit and elected not to proceed with the testing at that time.       She had her first menstrual period at age 12 and is postmenopausal (ages 58). She does not have any children. Ginger has her ovaries, fallopian tubes and uterus in place. She reports that she has not used hormone replacement therapy. She used oral contraceptives for " about 5 years in the past. Ginger reports that she has had one colonoscopy at age 50 which detected no polyps and follow-up was recommended in 10 years (outside records not available for review today). Ginger reported occasional social tobacco use when she was younger and light alcohol use.    Family History: (Please see scanned pedigree for detailed family history information)  Maternal:  Her mother was diagnosed with breast cancer in her late 60s-70. She passed away at age 80 due to metastatic breast cancer. She also had a hysterectomy and bilateral salpingo-oophorectomy in her late 40s or 50s for non-cancer reasons.   Her grandmother was diagnosed with breast cancer in her late 50s or early 60s. This was metastatic and she passed away in her early 60s.   Ginger reports that there are multiple extended relatives on this side of the family (she thinks possibly her mother's cousins) who have had breast cancer. She reports that there has been some genetic testing in these relatives and that a BRCA mutation was identified in the family, although she does not have any further information available about this at this time.   Paternal:  Her father passed away in his early 80s with no known history of cancer.   She reports that she has more limited information regarding the health history of her aunts/uncles/cousins on this side of the family. She is not aware of any cancers.   Both of her grandparents passed away in their early 90s with no known history of cancer.    Her maternal ethnicity is Polish. Her paternal ethnicity is Haitian, Eastern . There is no known Ashkenazi Hindu ancestry on either side of her family.     Discussion:  Ginger's personal and family history of cancer is suggestive of a hereditary cancer syndrome.  We reviewed the features of sporadic, familial, and hereditary cancers. In looking at Ginger's family history, it is possible that a cancer susceptibility gene is present due to her personal  history of breast cancer and her family history of breast cancer in her mother and maternal grandmother. She also reported that a BRCA mutation has been identified in her extended maternal relatives through her grandmother's side of the family.  We discussed the natural history and genetics of hereditary cancer. Based on her personal and family history, we discussed genes in which mutations are associated with an increased risk for breast cancer, such as the BRCA1 and BRCA2 genes. Mutations in these genes cause a condition known as Hereditary Breast and Ovarian Cancer syndrome (HBOC). Women with a mutation in either of these genes are at increased risk for breast and ovarian cancer. There is also an increased risk for a second primary breast cancer. Men with a mutation in either of these genes are at increased risk for breast and prostate cancer. Both women and men may also be at increased risk for pancreatic cancer and melanoma. A detailed handout regarding these genes and other genes in which mutations are associated with an increased risk for breast cancer will be provided to Ginger via [employer] and can be found in the after visit summary. Topics included: inheritance pattern, cancer risks, cancer screening recommendations, and also risks, benefits and limitations of testing.    Based on her personal and family history, Ginger meets current National Comprehensive Cancer Network (NCCN) criteria for genetic testing of high-penetrance breast cancer susceptibility genes, specifically, BRCA1, BRCA2, CDH1, PALB2, PTEN, and TP53.     We discussed that there are additional genes that could cause increased risk for breast and other cancers. As many of these genes present with overlapping features in a family and accurate cancer risk cannot always be established based upon the pedigree analysis alone, it would be reasonable for Ginger to consider panel genetic testing to analyze multiple genes at once.  We reviewed genetic  testing options for Ginger based on her personal and family history: a panel of genes associated with an increased risk for certain cancers, or larger panel options to include genes associated with increased risk for multiple different cancer types. She expressed an interest in more broad testing and opted for the Common Hereditary Cancers Panel.  Genetic testing is available for 48 genes associated with cancers of the breast, ovary, uterus, prostate and gastrointestinal system: [facility] Common Hereditary Cancers panel (APC, PHYLLIS, AXIN2, BAP1, BARD1, BMPR1A, BRCA1, BRCA2, BRIP1, CDH1, CDK4, CDKN2A, CHEK2, CTNNA1, DICER1, EPCAM, FH, GREM1, HOXB13, KIT, MBD4, MEN1, MLH1, MSH2, MSH3, MSH6, MUTYH, NF1, NTHL1, PALB2, PDGFRA, PMS2, POLD1, POLE, PTEN, RAD51C, RAD51D, SDHA, SDHB, SDHC, SDHD, SMAD4, SMARCA4, STK11, TP53, TSC1, TSC2, VHL).    We discussed that many of these genes are associated with specific hereditary cancer syndromes and published management guidelines: Hereditary Breast and Ovarian Cancer syndrome (BRCA1, BRCA2), Mendoza syndrome (MLH1, MSH2, MSH6, PMS2, EPCAM), Familial Adenomatous Polyposis (APC), Hereditary Diffuse Gastric Cancer (CDH1), Familial Atypical Multiple Mole Melanoma syndrome (CDK4, CDKN2A), Multiple Endocrine Neoplasia type 1 (MEN1), Juvenile Polyposis syndrome (BMPR1A, SMAD4), Cowden syndrome (PTEN), Li Fraumeni syndrome (TP53), Hereditary Paraganglioma and Pheochromocytoma syndrome (SDHA, SDHB, SDHC, SDHD), Peutz-Jeghers syndrome (STK11), MUTYH Associated Polyposis (MUTYH), Tuberous sclerosis complex (TSC1, TSC2), Von Hippel-Lindau disease (VHL), and Neurofibromatosis type 1 (NF1).   The PHYLLIS, AXIN2, BAP1, BRIP1, CHEK2, FH, GREM1, MBD4, MSH3, NTHL1, PALB2, POLD1, POLE, RAD51C, and RAD51D genes are associated with increased cancer risk and have published management guidelines for certain cancers.   The remaining genes (BARD1, CTNNA1, DICER1, HOXB13, KIT, PDGFRA, and SMARCA4) are associated with  increased cancer risk and may allow us to make medical recommendations when mutations are identified.   Due to COVID-19 precautions consent was obtained over the phone/video today. Genetic testing via the Common Hereditary Cancers Panel will be sent to [facility]. Ginger opted to schedule a blood draw for testing. Turnaround time from date when sample is received at the lab: approximately 3-4 weeks.  Medical Management: For Ginger, we reviewed that the information from genetic testing may determine:  additional cancer screening for which Ginger may qualify (i.e. mammogram and breast MRI, more frequent colonoscopies, more frequent dermatologic exams, etc.),  options for risk reducing surgeries Ginger could consider (i.e. bilateral mastectomy, surgery to remove her ovaries and/or uterus, etc.),    and targeted chemotherapies if she were to develop certain cancers in the future (i.e. immunotherapy for individuals with Mendoza syndrome, PARP inhibitors, etc.).   These recommendations will be discussed in detail once genetic testing is completed.     Plan:  1) Today Ginger elected to proceed with genetic testing via the Common Hereditary Cancers Panel offered by [facility].  2) This information should be available in 4-5 weeks.  3) Ginger will be scheduled for a virtual visit (phone or video) to discuss the results.    Mechelle Arthur MS, [facility]  Licensed, Certified Genetic Counselor  Office: [phone]  Email: [email or web address]       Again, thank you for allowing me to participate in the care of your patient.        Sincerely,        Mechelle Arthur GC

## 2024-07-30 NOTE — PROGRESS NOTES
"Virtual Visit Details    Type of service:  Telephone Visit   Phone call duration: 69 minutes   Originating Location (pt. Location): Home  Distant Location (provider location):  Off-site    7/30/2024    Referring Provider: Carissa Padilla APRN CNP    Presenting Information:   I spoke with Ginger Hernandez over the phone today for genetic counseling to discuss her personal and family history of cancer. This appointment was conducted virtually due to COVID-19 precautions. We talked today to review this history, cancer screening recommendations, and available genetic testing options.    Personal History:  Ginger is a 69 year old female. She was diagnosed with a right breast cancer at age 67 in December 2022 (IDC, ER positive, NC positive, HER2 negative). She underwent right lumpectomy on 1/17/23, followed by chemotherapy, radiation, and some anastrozole.     She was previously seen for genetic counseling on 1/9/23 at the [facility]. At that visit she elected to pursue genetic testing for the \"custom cancer gene panel evaluating the breast, gyn and common cancer genes\". This testing was to be completed via a saliva kit sent to her from [facility]. Ginger did not end up going through with the genetic testing at that time. She reported today that she did not have a good experience at that previous genetic counseling visit and elected not to proceed with the testing at that time.       She had her first menstrual period at age 12 and is postmenopausal (ages 58). She does not have any children. Ginger has her ovaries, fallopian tubes and uterus in place. She reports that she has not used hormone replacement therapy. She used oral contraceptives for about 5 years in the past. Ginger reports that she has had one colonoscopy at age 50 which detected no polyps and follow-up was recommended in 10 years (outside records not available for review today). Ginger reported occasional social tobacco use when " she was younger and light alcohol use.    Family History: (Please see scanned pedigree for detailed family history information)  Maternal:  Her mother was diagnosed with breast cancer in her late 60s-70. She passed away at age 80 due to metastatic breast cancer. She also had a hysterectomy and bilateral salpingo-oophorectomy in her late 40s or 50s for non-cancer reasons.   Her grandmother was diagnosed with breast cancer in her late 50s or early 60s. This was metastatic and she passed away in her early 60s.   Ginger reports that there are multiple extended relatives on this side of the family (she thinks possibly her mother's cousins) who have had breast cancer. She reports that there has been some genetic testing in these relatives and that a BRCA mutation was identified in the family, although she does not have any further information available about this at this time.   Paternal:  Her father passed away in his early 80s with no known history of cancer.   She reports that she has more limited information regarding the health history of her aunts/uncles/cousins on this side of the family. She is not aware of any cancers.   Both of her grandparents passed away in their early 90s with no known history of cancer.    Her maternal ethnicity is [place]. Her paternal ethnicity is [place], Eastern . There is no known Ashkenazi Protestant ancestry on either side of her family.     Discussion:  Ginger's personal and family history of cancer is suggestive of a hereditary cancer syndrome.  We reviewed the features of sporadic, familial, and hereditary cancers. In looking at Ginger's family history, it is possible that a cancer susceptibility gene is present due to her personal history of breast cancer and her family history of breast cancer in her mother and maternal grandmother. She also reported that a BRCA mutation has been identified in her extended maternal relatives through her grandmother's side of the family.  We  discussed the natural history and genetics of hereditary cancer. Based on her personal and family history, we discussed genes in which mutations are associated with an increased risk for breast cancer, such as the BRCA1 and BRCA2 genes. Mutations in these genes cause a condition known as Hereditary Breast and Ovarian Cancer syndrome (HBOC). Women with a mutation in either of these genes are at increased risk for breast and ovarian cancer. There is also an increased risk for a second primary breast cancer. Men with a mutation in either of these genes are at increased risk for breast and prostate cancer. Both women and men may also be at increased risk for pancreatic cancer and melanoma. A detailed handout regarding these genes and other genes in which mutations are associated with an increased risk for breast cancer will be provided to Ginger via [employer] [facility] and can be found in the after visit summary. Topics included: inheritance pattern, cancer risks, cancer screening recommendations, and also risks, benefits and limitations of testing.    Based on her personal and family history, Ginger meets current National Comprehensive Cancer Network (NCCN) criteria for genetic testing of high-penetrance breast cancer susceptibility genes, specifically, BRCA1, BRCA2, CDH1, PALB2, PTEN, and TP53.     We discussed that there are additional genes that could cause increased risk for breast and other cancers. As many of these genes present with overlapping features in a family and accurate cancer risk cannot always be established based upon the pedigree analysis alone, it would be reasonable for Ginger to consider panel genetic testing to analyze multiple genes at once.  We reviewed genetic testing options for Ginger based on her personal and family history: a panel of genes associated with an increased risk for certain cancers, or larger panel options to include genes associated with increased risk for multiple different cancer types. She  expressed an interest in more broad testing and opted for the Common Hereditary Cancers Panel.  Genetic testing is available for 48 genes associated with cancers of the breast, ovary, uterus, prostate and gastrointestinal system: [facility] Common Hereditary Cancers panel (APC, PHYLLIS, AXIN2, BAP1, BARD1, BMPR1A, BRCA1, BRCA2, BRIP1, CDH1, CDK4, CDKN2A, CHEK2, CTNNA1, DICER1, EPCAM, FH, GREM1, HOXB13, KIT, MBD4, MEN1, MLH1, MSH2, MSH3, MSH6, MUTYH, NF1, NTHL1, PALB2, PDGFRA, PMS2, POLD1, POLE, PTEN, RAD51C, RAD51D, SDHA, SDHB, SDHC, SDHD, SMAD4, SMARCA4, STK11, TP53, TSC1, TSC2, VHL).    We discussed that many of these genes are associated with specific hereditary cancer syndromes and published management guidelines: Hereditary Breast and Ovarian Cancer syndrome (BRCA1, BRCA2), Mendoza syndrome (MLH1, MSH2, MSH6, PMS2, EPCAM), Familial Adenomatous Polyposis (APC), Hereditary Diffuse Gastric Cancer (CDH1), Familial Atypical Multiple Mole Melanoma syndrome (CDK4, CDKN2A), Multiple Endocrine Neoplasia type 1 (MEN1), Juvenile Polyposis syndrome (BMPR1A, SMAD4), Cowden syndrome (PTEN), Li Fraumeni syndrome (TP53), Hereditary Paraganglioma and Pheochromocytoma syndrome (SDHA, SDHB, SDHC, SDHD), Peutz-Jeghers syndrome (STK11), MUTYH Associated Polyposis (MUTYH), Tuberous sclerosis complex (TSC1, TSC2), Von Hippel-Lindau disease (VHL), and Neurofibromatosis type 1 (NF1).   The PHYLLIS, AXIN2, BAP1, BRIP1, CHEK2, FH, GREM1, MBD4, MSH3, NTHL1, PALB2, POLD1, POLE, RAD51C, and RAD51D genes are associated with increased cancer risk and have published management guidelines for certain cancers.   The remaining genes (BARD1, CTNNA1, DICER1, HOXB13, KIT, PDGFRA, and SMARCA4) are associated with increased cancer risk and may allow us to make medical recommendations when mutations are identified.   Due to COVID-19 precautions consent was obtained over the phone/video today. Genetic testing via the Common Hereditary Cancers Panel will be sent to  [facility]. Ginger opted to schedule a blood draw for testing. Turnaround time from date when sample is received at the lab: approximately 3-4 weeks.  Medical Management: For Ginger, we reviewed that the information from genetic testing may determine:  additional cancer screening for which Ginger may qualify (i.e. mammogram and breast MRI, more frequent colonoscopies, more frequent dermatologic exams, etc.),  options for risk reducing surgeries Ginger could consider (i.e. bilateral mastectomy, surgery to remove her ovaries and/or uterus, etc.),    and targeted chemotherapies if she were to develop certain cancers in the future (i.e. immunotherapy for individuals with Mendoza syndrome, PARP inhibitors, etc.).   These recommendations will be discussed in detail once genetic testing is completed.     Plan:  1) Today Ginger elected to proceed with genetic testing via the Common Hereditary Cancers Panel offered by [facility].  2) This information should be available in 4-5 weeks.  3) Ginger will be scheduled for a virtual visit (phone or video) to discuss the results.    Mechelle Arthur MS, [facility]  Licensed, Certified Genetic Counselor  Office: [phone]  Email: [email or web address]

## 2024-07-30 NOTE — NURSING NOTE
Current patient location: [id number] [facility] [id number]    Is the patient currently in the state of [place]? YES    Visit mode:TELEPHONE    If the visit is dropped, the patient can be reconnected by: TELEPHONE VISIT: Phone number:   Telephone Information:   Mobile [phone]       Will anyone else be joining the visit? NO  (If patient encounters technical issues they should call [phone]    How would you like to obtain your AVS? MyChart    Are changes needed to the allergy or medication list? N/A    Are refills needed on medications prescribed by this physician? NO    Reason for visit: Consult    Laura MATHEW

## 2024-07-31 NOTE — PATIENT INSTRUCTIONS
Assessing Cancer Risk  Cancer is a common diagnosis which impacts many families.  Individuals may develop cancer due to environmental factors (such as exposures and lifestyle), aging, genetic predisposition, or a combination of these factors.      Only about 5-10% of cancers are thought to be due to an inherited cancer susceptibility gene.    These families often have:  Several people with the same or related types of cancer  Cancers diagnosed at a young age (before age 50)  Individuals with more than one primary cancer  Multiple generations of the family affected with cancer    Comprehensive Breast and Gynecologic Cancer Panel  We each inherit two copies of every gene in our bodies: one from our mother, and one from our father. Each gene has a specific job to do.  When a gene has a mistake or  mutation  in it, it does not work like it should.     Some people may be candidates for genetic testing of more than one gene.  For these families, genetic testing using a cancer panel may be offered. These panels will test different genes at once known to increase the risk for breast, ovarian, uterine, and/or other cancers.    This handout will review common hereditary breast and gynecologic cancer syndromes. The genes that will be discussed in this handout are: PHYLLIS, BRCA1, BRCA2, BRIP1, CDH1, CHEK2, MLH1, MSH2, MSH6, PMS2, EPCAM, PTEN, PALB2, RAD51C, RAD51D, and TP53.    The purpose of this handout is to serve as a brief summary of the breast and gynecologic cancer risk genes that have published clinical management guidelines for individuals who are found to carry a mutation. Inheriting a mutation does not mean a person will develop cancer, but it does significantly increase their risk above the general population risk.     ______________________________________________________________________________    Hereditary Breast and Ovarian Cancer Syndrome (BRCA1 and BRCA2)  A single mutation in one of the copies of BRCA1 or  BRCA2 increases the risk for breast and ovarian cancer, among others.  The risk for pancreatic cancer and melanoma may also be slightly increased in some families.  The chart below shows the chance that someone with a BRCA mutation would develop cancer in his or her lifetime1,2,3,4.       Lifetime Cancer Risks    General Population BRCA1  BRCA2   Breast  12% >60% >60%   Ovarian  1-2% 39-58% 13-29%   Prostate 12% 7-26% 19-61%   Male Breast 0.1% 0.2-1.2% 1.8-7.1%   Pancreas 1-2% Up to 5% 5-10%     A person s ethnic background is also important to consider, as individuals of [employer]kenazi [employer] ancestry have a higher chance of having a BRCA gene mutation.  There are three BRCA mutations that occur more frequently in this population.      Mendoza Syndrome (MLH1, MSH2, MSH6, PMS2, and EPCAM)  Currently five genes are known to cause Mendoza Syndrome: MLH1, MSH2, MSH6, PMS2, and EPCAM.  A single mutation in one of the Mendoza Syndrome genes increases the risk for colon, endometrial, ovarian, and stomach cancers.  Other cancers that occur less commonly in Mendoza Syndrome include urinary tract, skin, and brain cancers.  The chart below shows the chance that a person with Mendoza syndrome would develop cancer in his or her lifetime5.      Lifetime Cancer Risks    General Population Mendoza Syndrome   Colon 5% 10-61%   Endometrial 3% 13-57%   Ovarian 1-2% 1-38%   Stomach <1% 1-9%   *Cancer risk varies depending on Mendoza syndrome gene found      Cowden Syndrome (PTEN)  Cowden syndrome is a hereditary condition that increases the risk for breast, thyroid, endometrial, colon, and kidney cancer.  Cowden syndrome is caused by a mutation in the PTEN gene.  A single mutation in one of the copies of PTEN causes Cowden syndrome and increases cancer risk.  The chart below shows the chance that someone with a PTEN mutation would develop cancer in their lifetime6,7.  Other benign features seen in some individuals with Cowden syndrome include benign  skin lesions (facial papules, keratoses, lipomas), learning disability, autism, thyroid nodules, colon polyps, and larger head size.     Lifetime Cancer Risks    General Population Cowden   Breast 12% 40-60%*   Thyroid 1% Up to 38%   Renal 1-2% Up to 35%   Endometrial 3% Up to 28%   Colon 5% Up to 9%   Melanoma 2-3% Up to 6%   *Emerging data suggests the risk for breast cancer could be greater than 60%               Li-Fraumeni Syndrome (TP53)  Li-Fraumeni Syndrome (LFS) is a cancer predisposition syndrome caused by a mutation in the TP53 gene. A single mutation in one of the copies of TP53 increases the risk for multiple cancers. Individuals with LFS are at an increased risk for developing cancer at a young age. The lifetime risk for development of a LFS-associated cancer is 50% by age 30 and 90% by age 60.   Core Cancers: Sarcomas, Breast, Brain, Lung, Leukemias/Lymphomas, Adrenocortical carcinomas  Other Cancers: Gastrointestinal, Thyroid, Skin, Genitourinary       Hereditary Diffuse Gastric Cancer (CDH1)  Currently, one gene is known to cause hereditary diffuse gastric cancer (HDGC): CDH1.  Individuals with HDGC are at increased risk for diffuse gastric cancer and lobular breast cancer. Of people diagnosed with HDGC, 30-50% have a mutation in the CDH1 gene.  This suggests there are likely other genes that may cause HDGC that have not been identified yet.      Lifetime Cancer Risks    General Population HDGC   Diffuse Gastric  <1% ~80%   Breast 12% 41-60%       Additional Genes    PHYLLIS  PHYLLIS is a moderate-risk breast cancer gene. Women who have a mutation in PHYLLIS can have between a 2-4 fold increased risk for breast cancer compared to the general population8. PHYLLIS mutations have also been associated with increased risk for pancreatic cancer between 5-10%9. Individuals who inherit two PHYLLIS mutations have a condition called ataxia-telangiectasia (AT).  This rare autosomal recessive condition affects the nervous system  and immune system, and is associated with progressive cerebellar ataxia beginning in childhood. Individuals with ataxia-telangiectasia often have a weakened immune system and have an increased risk for childhood cancers.    PALB2  Mutations in PALB2 have been shown to increase the risk of breast cancer up to 41-60% in some families; where individuals fall within this risk range is dependent upon family [id number]. PALB2 mutations have also been associated with increased risk for pancreatic cancer between 5-10%.  Individuals who inherit two PALB2 mutations--one from their mother and one from their father--have a condition called Fanconi Anemia.  This rare autosomal recessive condition is associated with short stature, developmental delay, bone marrow failure, and increased risk for childhood cancers.    CHEK2   CHEK2 is a moderate-risk breast cancer gene.  Women who have a mutation in CHEK2 have around a 2-4 fold increased risk for breast cancer compared to the general population, and this risk may be higher depending upon family history.11,12,13 The risk of colon cancer may be twice as high as the general population risk of colon cancer of 5%. Mutations in CHEK2 have also been shown to increase the risk of other cancers, including prostate, however these cancer risks are currently not well understood.    BRIP1, RAD51C and RAD51D  Mutations in RAD51C and RAD51D have been shown to increase the risk of ovarian cancer and breast cancer 14,. Mutations in BRIP1 have been shown to increase the risk of ovarian cancer and possibly female breast cancer 15 .       Lifetime Cancer Risk    General Population        BRIP1   RAD51C  RAD51D   Breast 12% Not well defined 20-40% 20-40%   Ovarian 1-2% 5-15% 10-15% 10-20%     ______________________________________________________________  Inheritance  All of the cancer syndromes reviewed above are inherited in an autosomal dominant pattern.  This means that if a parent has a mutation,  each of their children will have a 50% chance of inheriting that same mutation. Therefore, each child --male or female-- would have a 50% chance of being at increased risk for developing cancer.    Image obtained from Genetics Home Reference, 2013     Mutations in some genes can occur de ralph, which means that a person s mutation occurred for the first time in them and was not inherited from a parent.  Now that they have the mutation, however, it can be passed on to future generations.    Genetic Testing  Genetic testing involves a blood test and will look for any harmful mutations that are associated with increased cancer risk.  If possible, it is recommended that the person(s) who has had cancer be tested before other family members.  That person will give us the most useful information about whether or not a specific gene is associated with the cancer in the family.    Results  There are three possible results of genetic testing:  Positive--a harmful mutation was identified in one or more of the genes  Negative--no mutations were identified in any of the genes tested  Variant of unknown significance--a variation in one of the genes was identified, but it is unclear how this impacts cancer risk in the family    Advantages and Disadvantages   There are advantages and disadvantages to genetic testing.    Advantages  May clarify your cancer risk  Can help you make medical decisions  May explain the cancers in your family  May give useful information to your family members (if you share your results)    Disadvantages  Possible negative emotional impact of learning about inherited cancer risk  Uncertainty in interpreting a negative test result in some situations  Possible genetic discrimination concerns (see below)    Genetic Information Nondiscrimination Act (IKE)  The Genetic Information Nondiscrimination Act of 2008 (IKE) is a federal law that protects individuals from health insurance or employment discrimination  based on a genetic test result alone (with some exceptions, including employers with fewer than 15 employees, and ).  Although rare, IKE  does not cover discrimination protections in terms of life insurance, long term care, or disability insurances.  Visit the National Human "[employer] Research Thendara website to learn more.    Reducing Cancer Risk  All of the genes described in this handout have nationally recognized cancer screening guidelines that would be recommended for individuals who test positive.  In addition to increased cancer screening, surgeries may be offered or recommended to reduce cancer risk.  Recommendations are based upon an individual s genetic test result as well as their personal and family history of cancer.    Questions to Think About Regarding Genetic Testing:  What effect will the test result have on me and my relationship with my family members if I have an inherited gene mutation?  If I don t have a gene mutation?  Should I share my test results, and how will my family react to this news, which may also affect them?  Are my children ready to learn new information that may one day affect their own health?    Hereditary Cancer Resources    FORCE: Facing Our Risk of Cancer Empowered facingourrisk.org   Bright Pink bebrightpink.org   Li-Fraumeni Syndrome Association lfsassociation.org   PTEN World PTENworld.com   No stomach for cancer, Inc. nostomachforcancer.org   Stomach cancer relief network Scrnet.org   Collaborative Group of the Americas on Inherited Colorectal Cancer (CGA) cgaicc.com    Cancer Care cancercare.org   American Cancer Society (ACS) cancer.org   National Cancer Thendara (NCI) cancer.gov     Please call us if you have any questions or concerns.   Cancer Risk Management Program [phone]-CANCER ([phone])  Michael Wilkerson, MS [facility]  [phone]  Ruby Clements, MS, [facility] [phone]  Millicent Ramos, MS, [facility]  [phone]  Amairani Stone, MS, [facility]  [phone]  Mechelle Arthur,  MS, [facility]  [phone]  Rula Saleh, MS, [facility] [phone]  Jennifer Waters, MS, [facility] [phone]    References  Nas Le PDP, Monty S, Claudia MERRITT, Zeinab JE, Rossi JL, Jean N, Jarret H, Albaro O, Ida A, Pasini B, Radimemo P, Manyeni S, Sanchez DM, Wilcox N, Mandeep E, Christy H, Marrero E, Eneida J, Gronbraulio J, Magy B, Tulinius H, Thorlacius S, Eerola H, Nevanlinna H, Julienne K, Sobeida OP. Average risks of breast and ovarian cancer associated with BRCA1 or BRCA2 mutations detected in case series unselected for family history: a combined analysis of 222 studies. Am J Hum Becki. 2003;72:1117-30.  Abeba N, Mary M, Gema G.  BRCA1 and BRCA2 Hereditary Breast and Ovarian Cancer. Gene Reviews online. 2013.  Neto YC, Anusha S, Agusto G, Torres S. Breast cancer risk among male BRCA1 and BRCA2 mutation carriers. J Natl Cancer Inst. 2007;99:1811-4.  Chuy VAZQUEZ, Treva I, Bird J, Faisal E, Cathie ER, Marika F. Risk of breast cancer in male BRCA2 carriers. J Med Becki. 2010;47:710-1.  National Comprehensive Cancer Network. Clinical practice guidelines in oncology, colorectal cancer screening. Available online (registration required). 2015.  Nestor MH, Agus J, Rhys J, María COLLINS, Glenn MS, Eng C. Lifetime cancer risks in individuals with germline PTEN mutations. Clin Cancer Res. 2012;18:400-7.  Tano R. Cowden Syndrome: A Critical Review of the Clinical Literature. J Becki . 2009:18:13-27.  Dave WALLS, Ok KAT, Vinh S, Brittani P, Saloni T, Stephane M, John B, Devon H, Macy R, Win K, Renetta L, Chuy VAZQUEZ, Sanchez KAT, Alexander DF, Jacqui MR, The Breast Cancer Susceptibility Collaboration (UK) & Anali RODRIGUEZ. PHYLLIS mutations that cause ataxia-telangiectasia are breast cancer susceptibility alleles. Nature Genetics. 2006;38:873-875  Reese N , Guillaume Y, Milagro J, Ernesto L, Felecia PADILLA , Mari ML, Skyler S, Dong AG, Larisa S, Vaishali ML, Delisa J , Macho R, Geraldine GA, Shanae  JR, Williams VE, Jacob M, Volibertystein B, Marlys N, Debbie RH, Gagan KW, and Tianna AP. PHYLLIS mutations in patients with hereditary pancreatic cancer. Cancer Discover. 2012;2:41-46  Anne RODGERS., et al. Breast-Cancer Risk in Families with Mutations in PALB2. NEJM. 2014; 371(6):497-506.  CHEK2 Breast Cancer Case-Control Consortium. CHEK2*1100delC and susceptibility to breast cancer: A collaborative analysis involving 10,860 breast cancer cases and 9,065 controls from 10 studies. Am J Hum Becki, 74 (2004), pp. 5959-1008  Karel T, Venancio S, Tiffanie K, et al. Spectrum of Mutations in BRCA1, BRCA2, CHEK2, and TP53 in Families at High Risk of Breast Cancer. JUANITO. 2006;295(12):3820-4881.   Bunny C, Tariq D, Billy WALLS, et al. Risk of breast cancer in women with a CHEK2 mutation with and without a family history of breast cancer. J Clin Oncol. 2011;29:9514-8032.  Song H, Keos E, Ramus SJ, et al. Contribution of germline mutations in the RAD51B, RAD51C, and RAD51D genes to ovarian cancer in the population. J Clin Oncol. 2015;33(26):4263-7691. Doi:10.1200/JCO.2015.61.2408.  Markie T, Antolin DF, Astrid P, et al. Mutations in BRIP1 confer high risk of ovarian cancer. Khloe Becki. 2011;43(11):6016-0483. doi:10.1038/ng.955.

## 2024-08-30 ENCOUNTER — TRANSFERRED RECORDS (OUTPATIENT)
Dept: HEALTH INFORMATION MANAGEMENT | Facility: CLINIC | Age: 69
End: 2024-08-30
Payer: COMMERCIAL

## 2024-09-17 ENCOUNTER — PATIENT OUTREACH (OUTPATIENT)
Dept: ONCOLOGY | Facility: CLINIC | Age: 69
End: 2024-09-17
Payer: COMMERCIAL

## 2024-09-17 DIAGNOSIS — R93.7 ABNORMAL MAGNETIC RESONANCE IMAGING OF CERVICAL SPINE: Primary | ICD-10-CM

## 2024-09-17 NOTE — PROGRESS NOTES
[facility]: Cancer Care                                                                                          Patient returned call. She saw a neurologist at [facility] in [place]. She had an MRI which showed possible metastatic disease. Patient will have records faxed here. Cervical spine images from [facility] pushed to [facility]    Signature:  Jennifer Cole RN

## 2024-09-17 NOTE — PROGRESS NOTES
[facility]: Cancer Care                                                                                          Received Cervical spine MRI report from [facility]. Dr. Page reviewed report.     Impression: Abnormal marrow signal identified throughout the cervical spine most notably at C2 and C4 with areas of abnormal enhancement in these regions raising concern for osseous metastatic disease or possibly multiple myeloma.     Labs ordered. Patient informed and scheduled for lab 9/19/24      Signature:  Jennifer Cole RN

## 2024-09-17 NOTE — PROGRESS NOTES
[facility]: Cancer Care                                                                                          Revived voice message from patient stating she has a new finding noted on imaging. Return call made to patient for further information. Message and contact number left to return call.     Signature:  Jennifer Cole RN

## 2024-09-18 DIAGNOSIS — R79.9 ABNORMAL BLOOD FINDING: Primary | ICD-10-CM

## 2024-09-18 DIAGNOSIS — D72.829 LEUKOCYTOSIS, UNSPECIFIED TYPE: ICD-10-CM

## 2024-09-18 DIAGNOSIS — C95.90: ICD-10-CM

## 2024-09-18 DIAGNOSIS — D75.9 DISEASE OF BLOOD AND BLOOD-FORMING ORGANS, UNSPECIFIED: ICD-10-CM

## 2024-09-19 ENCOUNTER — LAB (OUTPATIENT)
Dept: INFUSION THERAPY | Facility: CLINIC | Age: 69
End: 2024-09-19
Attending: NURSE PRACTITIONER
Payer: COMMERCIAL

## 2024-09-19 DIAGNOSIS — Z17.0 MALIGNANT NEOPLASM OF AREOLA OF RIGHT BREAST IN FEMALE, ESTROGEN RECEPTOR POSITIVE (H): ICD-10-CM

## 2024-09-19 DIAGNOSIS — R93.7 ABNORMAL MAGNETIC RESONANCE IMAGING OF CERVICAL SPINE: ICD-10-CM

## 2024-09-19 DIAGNOSIS — Z80.3 FAMILY HISTORY OF MALIGNANT NEOPLASM OF BREAST: ICD-10-CM

## 2024-09-19 DIAGNOSIS — D75.9 DISEASE OF BLOOD AND BLOOD-FORMING ORGANS, UNSPECIFIED: ICD-10-CM

## 2024-09-19 DIAGNOSIS — C50.011 MALIGNANT NEOPLASM OF AREOLA OF RIGHT BREAST IN FEMALE, ESTROGEN RECEPTOR POSITIVE (H): ICD-10-CM

## 2024-09-19 DIAGNOSIS — D72.829 LEUKOCYTOSIS, UNSPECIFIED TYPE: ICD-10-CM

## 2024-09-19 DIAGNOSIS — C95.90: ICD-10-CM

## 2024-09-19 LAB
ALBUMIN SERPL BCG-MCNC: 4.3 G/DL (ref 3.5–5.2)
ALP SERPL-CCNC: 124 U/L (ref 40–150)
ALT SERPL W P-5'-P-CCNC: 95 U/L (ref 0–50)
ANION GAP SERPL CALCULATED.3IONS-SCNC: 11 MMOL/L (ref 7–15)
AST SERPL W P-5'-P-CCNC: 46 U/L (ref 0–45)
BASOPHILS # BLD AUTO: 0 10E3/UL (ref 0–0.2)
BASOPHILS NFR BLD AUTO: 1 %
BILIRUB SERPL-MCNC: 0.5 MG/DL
BUN SERPL-MCNC: 13.4 MG/DL (ref 8–23)
CALCIUM SERPL-MCNC: 9.8 MG/DL (ref 8.8–10.4)
CHLORIDE SERPL-SCNC: 99 MMOL/L (ref 98–107)
CREAT SERPL-MCNC: 0.82 MG/DL (ref 0.51–0.95)
EGFRCR SERPLBLD CKD-EPI 2021: 77 ML/MIN/1.73M2
EOSINOPHIL # BLD AUTO: 0.2 10E3/UL (ref 0–0.7)
EOSINOPHIL NFR BLD AUTO: 4 %
ERYTHROCYTE [DISTWIDTH] IN BLOOD BY AUTOMATED COUNT: 13.2 % (ref 10–15)
GLUCOSE SERPL-MCNC: 151 MG/DL (ref 70–99)
HCO3 SERPL-SCNC: 25 MMOL/L (ref 22–29)
HCT VFR BLD AUTO: 44.9 % (ref 35–47)
HGB BLD-MCNC: 15 G/DL (ref 11.7–15.7)
IMM GRANULOCYTES # BLD: 0 10E3/UL
IMM GRANULOCYTES NFR BLD: 0 %
INTERPRETATION: NORMAL
LAB DIRECTOR COMMENTS: NORMAL
LAB DIRECTOR DISCLAIMER: NORMAL
LAB DIRECTOR INTERPRETATION: NORMAL
LAB DIRECTOR METHODOLOGY: NORMAL
LAB DIRECTOR RESULTS: NORMAL
LYMPHOCYTES # BLD AUTO: 1.7 10E3/UL (ref 0.8–5.3)
LYMPHOCYTES NFR BLD AUTO: 29 %
MCH RBC QN AUTO: 28.6 PG (ref 26.5–33)
MCHC RBC AUTO-ENTMCNC: 33.4 G/DL (ref 31.5–36.5)
MCV RBC AUTO: 86 FL (ref 78–100)
MONOCYTES # BLD AUTO: 0.6 10E3/UL (ref 0–1.3)
MONOCYTES NFR BLD AUTO: 11 %
NEUTROPHILS # BLD AUTO: 3.1 10E3/UL (ref 1.6–8.3)
NEUTROPHILS NFR BLD AUTO: 55 %
NRBC # BLD AUTO: 0 10E3/UL
NRBC BLD AUTO-RTO: 0 /100
PLATELET # BLD AUTO: 213 10E3/UL (ref 150–450)
POTASSIUM SERPL-SCNC: 3.9 MMOL/L (ref 3.4–5.3)
PROT SERPL-MCNC: 7.1 G/DL (ref 6.4–8.3)
RBC # BLD AUTO: 5.24 10E6/UL (ref 3.8–5.2)
RETICS # AUTO: 0.1 10E6/UL
RETICS/RBC NFR AUTO: 2 %
SIGNIFICANT RESULTS: NORMAL
SODIUM SERPL-SCNC: 135 MMOL/L (ref 135–145)
SPECIMEN DESCRIPTION: NORMAL
SPECIMEN DESCRIPTION: NORMAL
TEST DETAILS, MDL: NORMAL
TOTAL PROTEIN SERUM FOR ELP: 6.4 G/DL (ref 6.4–8.3)
WBC # BLD AUTO: 5.6 10E3/UL (ref 4–11)

## 2024-09-19 PROCEDURE — 85060 BLOOD SMEAR INTERPRETATION: CPT | Performed by: PATHOLOGY

## 2024-09-19 PROCEDURE — G0452 MOLECULAR PATHOLOGY INTERPR: HCPCS | Mod: 26 | Performed by: PATHOLOGY

## 2024-09-19 PROCEDURE — 84165 PROTEIN E-PHORESIS SERUM: CPT | Mod: 26 | Performed by: PATHOLOGY

## 2024-09-19 PROCEDURE — 36415 COLL VENOUS BLD VENIPUNCTURE: CPT

## 2024-09-19 PROCEDURE — 84155 ASSAY OF PROTEIN SERUM: CPT

## 2024-09-19 PROCEDURE — 83521 IG LIGHT CHAINS FREE EACH: CPT

## 2024-09-19 PROCEDURE — 84165 PROTEIN E-PHORESIS SERUM: CPT | Mod: TC | Performed by: PATHOLOGY

## 2024-09-19 PROCEDURE — 82040 ASSAY OF SERUM ALBUMIN: CPT

## 2024-09-19 PROCEDURE — 85045 AUTOMATED RETICULOCYTE COUNT: CPT

## 2024-09-19 PROCEDURE — 81206 BCR/ABL1 GENE MAJOR BP: CPT

## 2024-09-19 PROCEDURE — 81338 MPL GENE COMMON VARIANTS: CPT

## 2024-09-19 PROCEDURE — 85025 COMPLETE CBC W/AUTO DIFF WBC: CPT

## 2024-09-19 PROCEDURE — 81270 JAK2 GENE: CPT

## 2024-09-20 LAB
ALBUMIN SERPL ELPH-MCNC: 3.9 G/DL (ref 3.7–5.1)
ALPHA1 GLOB SERPL ELPH-MCNC: 0.3 G/DL (ref 0.2–0.4)
ALPHA2 GLOB SERPL ELPH-MCNC: 0.8 G/DL (ref 0.5–0.9)
B-GLOBULIN SERPL ELPH-MCNC: 0.7 G/DL (ref 0.6–1)
GAMMA GLOB SERPL ELPH-MCNC: 0.6 G/DL (ref 0.7–1.6)
KAPPA LC FREE SER-MCNC: 1.14 MG/DL (ref 0.33–1.94)
KAPPA LC FREE/LAMBDA FREE SER NEPH: 1.05 {RATIO} (ref 0.26–1.65)
LAMBDA LC FREE SERPL-MCNC: 1.09 MG/DL (ref 0.57–2.63)
LOCATION OF TASK: ABNORMAL
Lab: 1102
M PROTEIN SERPL ELPH-MCNC: 0.1 G/DL
PATH REPORT.COMMENTS IMP SPEC: NORMAL
PATH REPORT.COMMENTS IMP SPEC: NORMAL
PATH REPORT.FINAL DX SPEC: NORMAL
PATH REPORT.MICROSCOPIC SPEC OTHER STN: NORMAL
PATH REPORT.MICROSCOPIC SPEC OTHER STN: NORMAL
PATH REPORT.RELEVANT HX SPEC: NORMAL
PERFORMING LABORATORY: NORMAL
PROT PATTERN SERPL ELPH-IMP: ABNORMAL
SPECIMEN STATUS: NORMAL
TEST NAME: NORMAL

## 2024-09-25 LAB
INTERPRETATION: NORMAL
SIGNIFICANT RESULTS: NORMAL
SPECIMEN DESCRIPTION: NORMAL
TEST DETAILS, MDL: NORMAL

## 2024-09-27 LAB
SCANNED LAB RESULT: ABNORMAL
TEST NAME: ABNORMAL

## 2024-10-15 ENCOUNTER — TRANSFERRED RECORDS (OUTPATIENT)
Dept: HEALTH INFORMATION MANAGEMENT | Facility: CLINIC | Age: 69
End: 2024-10-15
Payer: COMMERCIAL

## 2024-10-16 ENCOUNTER — MEDICAL CORRESPONDENCE (OUTPATIENT)
Dept: HEALTH INFORMATION MANAGEMENT | Facility: CLINIC | Age: 69
End: 2024-10-16
Payer: COMMERCIAL

## 2024-10-16 ENCOUNTER — TRANSFERRED RECORDS (OUTPATIENT)
Dept: HEALTH INFORMATION MANAGEMENT | Facility: CLINIC | Age: 69
End: 2024-10-16
Payer: COMMERCIAL

## 2024-10-17 ENCOUNTER — PRE VISIT (OUTPATIENT)
Dept: RADIATION ONCOLOGY | Facility: CLINIC | Age: 69
End: 2024-10-17
Payer: COMMERCIAL

## 2024-10-17 ENCOUNTER — PATIENT OUTREACH (OUTPATIENT)
Dept: ONCOLOGY | Facility: CLINIC | Age: 69
End: 2024-10-17
Payer: COMMERCIAL

## 2024-10-17 ENCOUNTER — TRANSCRIBE ORDERS (OUTPATIENT)
Dept: OTHER | Age: 69
End: 2024-10-17

## 2024-10-17 DIAGNOSIS — C50.919 BREAST CANCER, FEMALE (H): Primary | ICD-10-CM

## 2024-10-17 DIAGNOSIS — M25.551 RIGHT HIP PAIN: ICD-10-CM

## 2024-10-17 DIAGNOSIS — C79.51 METASTASIS TO BONE (H): ICD-10-CM

## 2024-10-17 NOTE — TELEPHONE ENCOUNTER
Imaging DISC Received  October 21, 2024 1:29 PM AF   Action: Imaging disc from [facility] received and taken to [facility] for upload.       Action October 17, 2024 3:41 PM    Action Taken Received IB to gather img from [facility], and [facility].   Called [facility] to push img.     10/7/24: XR Pel & Hip  10/18/24: CT Chest    [facility] [employer] Tracking #: [id number]     Imaging Received  October 17, 2024 3:53 PM =   Action: Images from [facility]  received and resolved to PACS.

## 2024-10-17 NOTE — PROGRESS NOTES
New Patient Oncology Nurse Navigator Note     Referring provider: Sana Nguyen APRN CNP      Referring Clinic/Organization: [facility]      Referred to (specialty:) Radiation Oncology     Requested provider (if applicable): Location       Date Referral Received: October 17, 2024     Evaluation for:  Patient is aware her insurance will be out of network as of 1/1/25. She would like to get as much done this year as possible. Thank you!   C50.919 (ICD-10-CM) - Breast cancer, female (H)   C79.51 (ICD-10-CM) - Metastasis to bone (H)   M25.551 (ICD-10-CM) - Right hip pain     Breast cancer, female, bone metastasis, right hip pain. Multiple lytic lesions to spine, shoulder, ribs, CT on 10/15/24. Pt having a lot of back and rt hip pain.    Clinical History (per Nurse review of records provided):      Patient seen on 10/16/24 by Josy Shankar at [facility].                Records Location: Media and See Bookmarked material     Records Needed: NA     Additional testing needed prior to consult: NA    Payor: [employer] / Plan: [place] MED[place]VANTAGE / Product Type: Medicare /     October 17, 2024  Referral received and reviewed.   Looks like patient has seen Dr. Hendrickson in [place] in 7/2023. Message sent to Radiation Team for recommendation on if patient needs to be scheduled for RTC or new appointment.     10/17/2024 2:51 PM:  Called patient this afternoon to discuss referral. Patient has yet to be scheduled for her PET scan. She will be calling nurse navigator back once scheduled. Updated message sent to  Radiation Team as well. Message sent to Records team to work on getting imaging pushed to [facility]. Provided patient with contact information and encouraged her to call with any questions. Patient verbalized understanding of plan and was grateful for the coordination of care.     10/17/2024 3:23 PM:  Message received from Radiation Team. Per Team: Dr. Hendrickson next Wednesday, 10/23/24 at  1:30 pm with CT/SIM. Sent to NPS to schedule.   Called LVM for patient.       Jennifer JOHNSTONN, RN, OCN  Oncology Nurse Navigator   [facility]  Cancer [facility] Service Line   New Patient Hem/Onc Scheduling / Referrals: [phone] (fax: [phone] )

## 2024-10-21 ENCOUNTER — TRANSFERRED RECORDS (OUTPATIENT)
Dept: HEALTH INFORMATION MANAGEMENT | Facility: CLINIC | Age: 69
End: 2024-10-21
Payer: COMMERCIAL

## 2024-10-22 NOTE — TELEPHONE ENCOUNTER
Action    Action Taken 10/22/2024 9:42AM KEJAYLA   I called [facility] - they said the CT scan was done at [facility].     I called [facility]-they have the CT Chest scan but can't push it. I faxed over a request and [employer] shipping label to have an image disc mailed out.     Fax: [phone]  Mailing Address:   [address] [place] [address]    [employer] Tracking Number: [id number]

## 2024-10-23 ENCOUNTER — VIRTUAL VISIT (OUTPATIENT)
Dept: RADIATION ONCOLOGY | Facility: CLINIC | Age: 69
End: 2024-10-23
Payer: COMMERCIAL

## 2024-10-23 DIAGNOSIS — C79.51 SECONDARY MALIGNANT NEOPLASM OF BONE (H): ICD-10-CM

## 2024-10-23 DIAGNOSIS — C50.211 MALIGNANT NEOPLASM OF UPPER-INNER QUADRANT OF RIGHT FEMALE BREAST, UNSPECIFIED ESTROGEN RECEPTOR STATUS (H): Primary | ICD-10-CM

## 2024-10-23 PROCEDURE — G2211 COMPLEX E/M VISIT ADD ON: HCPCS | Mod: 95 | Performed by: RADIOLOGY

## 2024-10-23 PROCEDURE — 99215 OFFICE O/P EST HI 40 MIN: CPT | Mod: 95 | Performed by: RADIOLOGY

## 2024-10-23 RX ORDER — OXYCODONE HYDROCHLORIDE 60 MG/1
60 TABLET, FILM COATED, EXTENDED RELEASE ORAL 3 TIMES DAILY
COMMUNITY
Start: 2024-10-09

## 2024-10-23 RX ORDER — ACETAMINOPHEN 500 MG
500-1000 TABLET ORAL EVERY 6 HOURS PRN
COMMUNITY

## 2024-10-23 NOTE — NURSING NOTE
"Ginger is a 69 year old who is being evaluated via a billable video visit.    What phone number would you like to be contacted at? [phone]  How would you like to obtain your AVS? Edd    Video-Visit Details    Type of service:  Video Visit   Video End Time: per MD  Originating Location (pt. Location): Home    Distant Location (provider location):  On-site  Platform used for Video Visit: [facility]     Oncology Rooming Note    October 23, 2024 10:15 AM   Ginger Hernandez is a 69 year old female who presents for:    Chief Complaint: Video Return/Consult with Dr. Hendrickson     Initial Vitals: There were no vitals taken for this visit. Estimated body mass index is 35.56 kg/m  as calculated from the following:    Height as of 7/18/23: 1.621 m (5' 3.82\").    Weight as of 7/18/23: 93.4 kg (206 lb). There is no height or weight on file to calculate BSA.  Data Unavailable Comment: Data Unavailable   No LMP recorded. Patient is postmenopausal.  Allergies reviewed: Yes  Medications reviewed: Yes    Medications: Medication refills not needed today.  Pharmacy name entered into [employer]: [facility] [address]    Frailty Screening:   Is the patient here for a new oncology consult visit in cancer care? 1. Yes. Over the past month, have you experienced difficulty or required a caregiver to assist with:   1. Balance, walking or general mobility (including any falls)? YES- R hip and back pain   2. Completion of self-care tasks such as bathing, dressing, toileting, grooming/hygiene?  NO  3. Concentration or memory that affects your daily life?  NO       Clinical concerns: Video/Return Consult  Dr. Hendrickson was notified.    Considerations for radiation treatment   Pregnancy status: Female age 55+   Implanted Cardiac Devices: No   Any previous radiation therapy: Yes, XRT to R breast completed on 7/7/2023 with Dr. Hendrickson.      Vida Niño RN              "

## 2024-10-23 NOTE — PROGRESS NOTES
Dear Colleagues,  Today Ginger Hernandez was seen in consultation via video.    IDENTIFICATION: This is a 69 year old woman well known to me with a pathogenic variant identified in BRCA2 and CHEK2 with now metastatic breast cancer to the nodes, bones and potentially liver who presents with a painful right hip referred for palliative radiotherapy.      HISTORY OF PRESENT ILLNESS:  Ginger Hernandez is well-known to our clinic.  For detailed review of her presenting history please refer to my note dated May 22, 2023.  She is now 69 years old.  When she was 68 years old she was diagnosed with a right (UIQ) breast cancer.  She had a lumpectomy and SLNBx, revealing G3 IDCA w/ G3 DCIS, [id number] ER+/VA+/H2N- disease excised with a close noninvasive margin. This was followed by TC x4 completed on 5/2/23 with Dr. Jackson. Afterwards she completed adjuvant radiation therapy to the right breast on 7/7/23.  She had no unexpected radiation-induced toxicity while on treatment.  Afterwards she was started on Arimidex in 2023 and has been on it intermittently.    She was in her otherwise usual state of health until this summer when she developed neck pain and cervical radiculopathy.  Her Neurologist ordered an MRI that was completed on 8/30/24 that showed abnormal marrow signal throughout the cervical spine with areas concerning for osseous metastatic disease or multiple myeloma.  There was no evidence of pathologic fracture or cord signal abnormality.  Second page of report is missing from our files.    Dr. Jackson was not available so patient transferred care to Dr. Shankar and work up ensued.    On 10/15/24 chest CT showed thoracic lymphadenopathy and bony lesions consistent with metastases.  Bony lesions were new as compared to a lumbar spine CT completed in May 2024.  Indeterminate liver lesions and bilateral pleural effusions were also identified.    A PET/CT was completed yesterday.  I do not have the images for the visit today.   However report states findings suspicious for metastasis involving lymph nodes above the diaphragm and innumerable sites throughout the osseous structures.  As of today no biopsy has been completed.    She has had chronic lower back and pelvic pain which includes the bilateral groins. She also has a h/o arthritis, 4 back surgeries and has been on long term narcotics for several years. Recently she has had right hip pain for several months which progressed significantly over the past month.  Rated 7 out of 10 without pain medicine.  Taking Advil 600mg Q8 PO which helps somewhat.  Pain is described as being very sharp and exacerbated by movement.  She is being seen here today via video for consideration of palliative radiotherapy.    REVIEW OF SYSTEMS: As per HPI, a 14-point review of system is otherwise negative.  PAST RADIATION THERAPY: 4,256 cGy to the right breast with 1200 cGy of planned 1400 cGy tumor bed boost.  PAST CTD/PACEMAKER: Denies    Past Medical History:   Diagnosis Date    Acne rosacea 2/7/2023    Breast cancer (H) 12/12/2022    Right Breast    CTS (carpal tunnel syndrome) 10/9/2014    Depression 2/7/2023    Estrogen receptor positive neoplasm 2/7/2023    GERD (gastroesophageal reflux disease) 3/4/2016    Hypertension 3/3/2011    Keratoconus 2/7/2023    Lumbar pseudoarthrosis 2/7/2023    Macular degeneration (senile) of retina 4/11/2018    Malignant neoplasm of areola of right breast in female, estrogen receptor positive (H) 2/24/2023    Malignant neoplasm of upper-inner quadrant of female breast (H) 2/7/2023    Migraine headache 6/7/2003    Formatting of this note might be different from the original. Migraine Without Aura    Neuropathy 2/7/2023    Formatting of this note might be different from the original. from hep c tx, involving hands and feet Formatting of this note might be different from the original. from hep c tx, involving hands and feet    Nuclear sclerotic cataract 2/7/2023    Posterior  "tibial tendon dysfunction 5/19/2015    Formatting of this note might be different from the original. Posterior tibial tendon dysfunction -- Surg 5/19/15    RLS (restless legs syndrome) 2/7/2023    S/P lumbar fusion 2/1/2018    Trigger finger, acquired 10/9/2014    Formatting of this note might be different from the original. Trigger finger (acquired)       Past Surgical History:   Procedure Laterality Date    BREAST BIOPSY, RT/LT Right 12/12/2022    LUMPECTOMY BREAST WITH SENTINEL NODE, COMBINED Right 01/17/2023    Dr. Nascimento       Family History   Problem Relation Age of Onset    Breast Cancer Mother 70        metastatic    Breast Cancer Maternal Grandmother 60        metastatic    Breast Cancer Other         BRCA+ per patient report    Breast Cancer Other         BRCA+ per patient report    Breast Cancer Other         BRCA+ per patient report       Social History     Tobacco Use    Smoking status: Never    Smokeless tobacco: Never   Substance Use Topics    Alcohol use: Not Currently       Allergies   Allergen Reactions    Contrast Dye Anaphylaxis    Iodinated Contrast Media Anaphylaxis and Hives     Other reaction(s): Unknown  HUT Comment: xray dye; HUT Reaction: Anaphylaxis; HUT Reaction: Hives; HUT Severity: High; HUT Noted: 20110301  xray dye  xray dye      Ketamine Other (See Comments)     Other reaction(s): Emotional Disturbance, Unknown  \"complete, emotional reaction\"; HUT Comment: \"complete, emotional reaction\"\"complete, emotional reaction\"; HUT Reaction: Emotional Disturbance; HUT Noted: 20180525  \"complete, emotional reaction\"  \"complete, emotional reaction\"  \"complete, emotional reaction\"  Other reaction(s): Emotional Disturbance  \"complete, emotional reaction\"  \"complete, emotional reaction\"      Nabumetone      Other reaction(s): Gastrointestinal, GI Upset, Unknown  gerd; HUT Comment: gerd; HUT Reaction: Gastrointestinal; HUT Noted: [id number]  gerd  Other reaction(s): Gastrointestinal, GI " Upset  gerd  gerd      Pregabalin      Other reaction(s): confusion, Confusion, Confusion, Mental Status Change  HUT Reaction: Confusion; HUT Reaction: Confusion; HUT Noted: 20120315  Other reaction(s): Confusion, Mental Status Change           PHYSICAL EXAMINATION:  GENERAL: alert and no distress  EYES: Eyes grossly normal to inspection.  No discharge or erythema, or obvious scleral/conjunctival abnormalities.  RESP: No audible wheeze, cough, or visible cyanosis.    SKIN: Visible skin clear. No significant rash, abnormal pigmentation or lesions.  NEURO: Cranial nerves grossly intact.  Mentation and speech appropriate for age.  PSYCH: Appropriate affect, tone, and pace of words    ECOG PERFORMANCE STATUS: 1     IMAGING: I do not have the images for the visit today.  However I reviewed the report.    IMPRESSION/PLAN:  Ginger Hernandez is a 69 year old woman well known to me with a pathogenic variant identified in BRCA2 and CHEK2 with now metastatic breast cancer to the nodes, bones and potentially liver who presents with a painful right hip referred for palliative radiotherapy. She has had chronic lower back and pelvic pain which includes the bilateral groins. She also has a h/o arthritis, 4 back surgeries and has been on long term narcotics for several years.  She has not been evaluated by Ortho yet.  Pain is rated 7 out of 10 and relieved somewhat with advil.    Today we discussed the risks, benefits, treatment rationale and regimen of radiation therapy to the right hip. Risks include but are not limited to fatigue, skin erythema/changes, nausea, vomiting, erratic stools,  symptoms such as urgency or dysuria, bony fracture, nerve damage and secondary malignancy. Patient to return to clinic after being evaluated by Ortho.  At that time we will have her sign informed consent and complete palliative radiation treatment.    Additional problem list to be addressed in the following manner:  Systemic Treatment: Per   Raad.  Obtain second page of August 30, 2024 cervical MRI  Have PET/CT downloaded to PACS  Recommend patient be seen by orthopedic surgery for potential stabilization.  Patient prefers to see her own orthopedic Surgeon at [facility].  She will make appointment with them and then let us know when that appointment will take place.    There was ample time for questions and all were answered to the patient's satisfaction. Thank you for allowing me to participate in the care of this pleasant patient. If you have any questions, please do not hesitate to contact my office.     Sincerely,  Vernell Hendrickson MD    [phone] Pager   [phone] [facility]   [phone] Sheri Gaytan  [phone] Polly    Joined the call at 10/23/2024, 10:06:18 am.  Left the call at 10/23/2024, 10:35:49 am.

## 2024-11-05 ENCOUNTER — PATIENT OUTREACH (OUTPATIENT)
Dept: RADIATION ONCOLOGY | Facility: CLINIC | Age: 69
End: 2024-11-05
Payer: COMMERCIAL

## 2024-11-05 NOTE — PROGRESS NOTES
Attempted to contact Ginger today. Left voicemail for Ginger to follow up after her last visit with Dr. Hendrickson. Request patient call back to Dr. Hendrickson's office with update regarding orthopedic appointment with her [facility] provider.     Vida Niño RN

## 2024-11-07 ENCOUNTER — TELEPHONE (OUTPATIENT)
Dept: ONCOLOGY | Facility: CLINIC | Age: 69
End: 2024-11-07
Payer: COMMERCIAL

## 2024-11-07 NOTE — TELEPHONE ENCOUNTER
Spoke with patient about her upcoming appointment with Carissa Padilla in January, patient has already established oncology care with Dr. Shankar and would like her appt in January cancelled.    traMADol (ULTRAM) 50 MG tablet      Last Written Prescription Date:  7/22/16  Last Fill Quantity: 180,   # refills: 5  Last Office Visit with [facility], P or  Health prescribing provider: 7/22/16  Future Office visit:       Routing refill request to provider for review/approval because:  Drug not on the [facility], [facility] or  Health refill protocol or controlled substance

## 2024-11-12 ENCOUNTER — PATIENT OUTREACH (OUTPATIENT)
Dept: RADIATION ONCOLOGY | Facility: CLINIC | Age: 69
End: 2024-11-12
Payer: COMMERCIAL

## 2024-11-12 NOTE — PROGRESS NOTES
Attempted to contact patient regarding scheduling an orthopedic appointment per Dr. Hendrickson's recommendation. Chart reviewed, patient had recent hernia repair surgery on 11/5/24. Left voicemail for patient to call Dr. Hendrickson's office to follow up regarding her radiation plan of care.    Vida Niño RN

## 2024-11-13 ENCOUNTER — PATIENT OUTREACH (OUTPATIENT)
Dept: RADIATION ONCOLOGY | Facility: CLINIC | Age: 69
End: 2024-11-13
Payer: COMMERCIAL

## 2024-11-14 NOTE — PROGRESS NOTES
"Left voicemail for Ginger today to follow up since her voicemail back to Dr. Hendrickson's office was cut short yesterday as she hung up during her message. Received a call back and voicemail from Ginger. She became escalated in the voicemail back to this RN stating, \"How dare you ask if I want to proceed with radiation in a voicemail and no I didn't go to the orthopedic doctor because you obviously don't understand that having emergency surgery for a hernia isn't a big enough deal to you.\" She stated, \"If you want to talk to me, call me back and have an actual conversation.\" This RN called Ginger back and apologized, she again became escalated on the phone stating, \"I am not going to go see an orthopedic doctor just because Dr. Hendrickson said I should. I can't believe you would ask me these questions after I just had surgery for a hernia.\" Attempted to ask Ginger about following up at a later date to discuss her wishes for her plan of care. Ginger states, \"I'm not going to do radiation so just forget it.\" Updated Dr. Hendrickson. No further radiation follow up indicated per patient wishes.     Vida Niño RN   "

## 2024-12-18 ENCOUNTER — LAB REQUISITION (OUTPATIENT)
Dept: LAB | Facility: CLINIC | Age: 69
End: 2024-12-18
Payer: COMMERCIAL

## 2024-12-18 DIAGNOSIS — E55.9 VITAMIN D DEFICIENCY, UNSPECIFIED: ICD-10-CM

## 2024-12-18 DIAGNOSIS — D64.9 ANEMIA, UNSPECIFIED: ICD-10-CM

## 2024-12-18 DIAGNOSIS — I10 ESSENTIAL (PRIMARY) HYPERTENSION: ICD-10-CM

## 2024-12-20 LAB
ANION GAP SERPL CALCULATED.3IONS-SCNC: 11 MMOL/L (ref 7–15)
BUN SERPL-MCNC: 31.4 MG/DL (ref 8–23)
CALCIUM SERPL-MCNC: 10 MG/DL (ref 8.8–10.4)
CHLORIDE SERPL-SCNC: 97 MMOL/L (ref 98–107)
CREAT SERPL-MCNC: 1.28 MG/DL (ref 0.51–0.95)
EGFRCR SERPLBLD CKD-EPI 2021: 45 ML/MIN/1.73M2
ERYTHROCYTE [DISTWIDTH] IN BLOOD BY AUTOMATED COUNT: 16.2 % (ref 10–15)
GLUCOSE SERPL-MCNC: 113 MG/DL (ref 70–99)
HCO3 SERPL-SCNC: 26 MMOL/L (ref 22–29)
HCT VFR BLD AUTO: 32.6 % (ref 35–47)
HGB BLD-MCNC: 10 G/DL (ref 11.7–15.7)
MCH RBC QN AUTO: 26.7 PG (ref 26.5–33)
MCHC RBC AUTO-ENTMCNC: 30.7 G/DL (ref 31.5–36.5)
MCV RBC AUTO: 87 FL (ref 78–100)
PLATELET # BLD AUTO: 307 10E3/UL (ref 150–450)
POTASSIUM SERPL-SCNC: 4.9 MMOL/L (ref 3.4–5.3)
RBC # BLD AUTO: 3.75 10E6/UL (ref 3.8–5.2)
SODIUM SERPL-SCNC: 134 MMOL/L (ref 135–145)
VIT D+METAB SERPL-MCNC: 11 NG/ML (ref 20–50)
WBC # BLD AUTO: 8.9 10E3/UL (ref 4–11)

## 2024-12-20 PROCEDURE — 85027 COMPLETE CBC AUTOMATED: CPT | Mod: ORL | Performed by: FAMILY MEDICINE

## 2024-12-20 PROCEDURE — 82947 ASSAY GLUCOSE BLOOD QUANT: CPT | Performed by: FAMILY MEDICINE

## 2024-12-20 PROCEDURE — 36415 COLL VENOUS BLD VENIPUNCTURE: CPT | Performed by: FAMILY MEDICINE

## 2024-12-20 PROCEDURE — 80048 BASIC METABOLIC PNL TOTAL CA: CPT | Mod: ORL | Performed by: FAMILY MEDICINE

## 2024-12-20 PROCEDURE — 80051 ELECTROLYTE PANEL: CPT | Performed by: FAMILY MEDICINE

## 2024-12-20 PROCEDURE — 82306 VITAMIN D 25 HYDROXY: CPT | Mod: ORL | Performed by: FAMILY MEDICINE

## 2024-12-20 PROCEDURE — P9604 ONE-WAY ALLOW PRORATED TRIP: HCPCS | Mod: ORL | Performed by: FAMILY MEDICINE
